# Patient Record
Sex: FEMALE | Race: WHITE | NOT HISPANIC OR LATINO | Employment: OTHER | URBAN - METROPOLITAN AREA
[De-identification: names, ages, dates, MRNs, and addresses within clinical notes are randomized per-mention and may not be internally consistent; named-entity substitution may affect disease eponyms.]

---

## 2017-01-01 ENCOUNTER — OUTSIDE FACILITY SERVICE (OUTPATIENT)
Dept: HOSPITALIST | Facility: HOSPITAL | Age: 82
End: 2017-01-01

## 2017-01-01 ENCOUNTER — APPOINTMENT (OUTPATIENT)
Dept: GENERAL RADIOLOGY | Facility: HOSPITAL | Age: 82
End: 2017-01-01

## 2017-01-01 ENCOUNTER — HOSPITAL ENCOUNTER (OUTPATIENT)
Facility: HOSPITAL | Age: 82
Setting detail: OBSERVATION
Discharge: HOME | End: 2017-07-04
Attending: EMERGENCY MEDICINE | Admitting: INTERNAL MEDICINE

## 2017-01-01 ENCOUNTER — APPOINTMENT (OUTPATIENT)
Dept: CT IMAGING | Facility: HOSPITAL | Age: 82
End: 2017-01-01

## 2017-01-01 ENCOUNTER — APPOINTMENT (OUTPATIENT)
Dept: MRI IMAGING | Facility: HOSPITAL | Age: 82
End: 2017-01-01

## 2017-01-01 ENCOUNTER — LAB REQUISITION (OUTPATIENT)
Dept: LAB | Facility: HOSPITAL | Age: 82
End: 2017-01-01

## 2017-01-01 VITALS
OXYGEN SATURATION: 94 % | TEMPERATURE: 97.9 F | DIASTOLIC BLOOD PRESSURE: 71 MMHG | HEART RATE: 85 BPM | WEIGHT: 147 LBS | RESPIRATION RATE: 17 BRPM | SYSTOLIC BLOOD PRESSURE: 174 MMHG | HEIGHT: 64 IN | BODY MASS INDEX: 25.1 KG/M2

## 2017-01-01 DIAGNOSIS — J96.00 ACUTE RESPIRATORY FAILURE (HCC): ICD-10-CM

## 2017-01-01 DIAGNOSIS — R42 VERTIGO: Primary | ICD-10-CM

## 2017-01-01 DIAGNOSIS — E87.1 HYPONATREMIA: ICD-10-CM

## 2017-01-01 DIAGNOSIS — R53.1 WEAKNESS: ICD-10-CM

## 2017-01-01 DIAGNOSIS — R11.2 NAUSEA AND VOMITING, INTRACTABILITY OF VOMITING NOT SPECIFIED, UNSPECIFIED VOMITING TYPE: ICD-10-CM

## 2017-01-01 LAB
25(OH)D3 SERPL-MCNC: 21 NG/ML
ALBUMIN SERPL-MCNC: 3.6 G/DL (ref 3.5–5.2)
ALBUMIN SERPL-MCNC: 4 G/DL (ref 3.5–5.2)
ALBUMIN/GLOB SERPL: 1.6 G/DL
ALBUMIN/GLOB SERPL: 1.6 G/DL
ALP SERPL-CCNC: 47 U/L (ref 40–129)
ALP SERPL-CCNC: 55 U/L (ref 40–129)
ALT SERPL W P-5'-P-CCNC: 12 U/L (ref 5–33)
ALT SERPL W P-5'-P-CCNC: 14 U/L (ref 5–33)
ANION GAP SERPL CALCULATED.3IONS-SCNC: 11 MMOL/L
ANION GAP SERPL CALCULATED.3IONS-SCNC: 12.8 MMOL/L
ANION GAP SERPL CALCULATED.3IONS-SCNC: 16.7 MMOL/L
AST SERPL-CCNC: 15 U/L (ref 5–32)
AST SERPL-CCNC: 16 U/L (ref 5–32)
BACTERIA UR QL AUTO: ABNORMAL /HPF
BASOPHILS # BLD AUTO: 0.02 10*3/MM3 (ref 0–0.2)
BASOPHILS NFR BLD AUTO: 0.3 % (ref 0–2)
BILIRUB SERPL-MCNC: 0.2 MG/DL (ref 0.2–1.2)
BILIRUB SERPL-MCNC: 0.2 MG/DL (ref 0.2–1.2)
BILIRUB UR QL STRIP: NEGATIVE
BUN BLD-MCNC: 11 MG/DL (ref 8–23)
BUN BLD-MCNC: 11 MG/DL (ref 8–23)
BUN BLD-MCNC: 15 MG/DL (ref 8–23)
BUN/CREAT SERPL: 10 (ref 7–25)
BUN/CREAT SERPL: 10.6 (ref 7–25)
BUN/CREAT SERPL: 11.6 (ref 7–25)
CALCIUM SPEC-SCNC: 7.9 MG/DL (ref 8.2–9.6)
CALCIUM SPEC-SCNC: 8.1 MG/DL (ref 8.2–9.6)
CALCIUM SPEC-SCNC: 8.6 MG/DL (ref 8.2–9.6)
CHLORIDE SERPL-SCNC: 90 MMOL/L (ref 98–107)
CHLORIDE SERPL-SCNC: 98 MMOL/L (ref 98–107)
CHLORIDE SERPL-SCNC: 98 MMOL/L (ref 98–107)
CK SERPL-CCNC: 116 U/L (ref 26–142)
CLARITY UR: CLEAR
CO2 SERPL-SCNC: 21.3 MMOL/L (ref 22–29)
CO2 SERPL-SCNC: 22.2 MMOL/L (ref 22–29)
CO2 SERPL-SCNC: 23 MMOL/L (ref 22–29)
COLOR UR: YELLOW
CREAT BLD-MCNC: 1.04 MG/DL (ref 0.57–1)
CREAT BLD-MCNC: 1.1 MG/DL (ref 0.57–1)
CREAT BLD-MCNC: 1.29 MG/DL (ref 0.57–1)
DEPRECATED RDW RBC AUTO: 39.8 FL (ref 37–54)
DEPRECATED RDW RBC AUTO: 40.4 FL (ref 37–54)
EOSINOPHIL # BLD AUTO: 0 10*3/MM3 (ref 0.1–0.3)
EOSINOPHIL NFR BLD AUTO: 0 % (ref 0–4)
ERYTHROCYTE [DISTWIDTH] IN BLOOD BY AUTOMATED COUNT: 12.3 % (ref 11.5–14.5)
ERYTHROCYTE [DISTWIDTH] IN BLOOD BY AUTOMATED COUNT: 12.4 % (ref 11.5–14.5)
FLUAV AG NPH QL: POSITIVE
FLUBV AG NPH QL IA: NEGATIVE
GFR SERPL CREATININE-BSD FRML MDRD: 39 ML/MIN/1.73
GFR SERPL CREATININE-BSD FRML MDRD: 47 ML/MIN/1.73
GFR SERPL CREATININE-BSD FRML MDRD: 50 ML/MIN/1.73
GLOBULIN UR ELPH-MCNC: 2.2 GM/DL
GLOBULIN UR ELPH-MCNC: 2.5 GM/DL
GLUCOSE BLD-MCNC: 104 MG/DL (ref 65–99)
GLUCOSE BLD-MCNC: 140 MG/DL (ref 65–99)
GLUCOSE BLD-MCNC: 87 MG/DL (ref 65–99)
GLUCOSE UR STRIP-MCNC: NEGATIVE MG/DL
HCT VFR BLD AUTO: 30.4 % (ref 37–47)
HCT VFR BLD AUTO: 32.6 % (ref 37–47)
HGB BLD-MCNC: 10.1 G/DL (ref 12–16)
HGB BLD-MCNC: 11 G/DL (ref 12–16)
HGB UR QL STRIP.AUTO: NEGATIVE
HYALINE CASTS UR QL AUTO: ABNORMAL /LPF
IMM GRANULOCYTES # BLD: 0.03 10*3/MM3 (ref 0–0.03)
IMM GRANULOCYTES NFR BLD: 0.4 % (ref 0–0.5)
INR PPP: 0.99 (ref 0.9–1.1)
KETONES UR QL STRIP: ABNORMAL
LEUKOCYTE ESTERASE UR QL STRIP.AUTO: ABNORMAL
LYMPHOCYTES # BLD AUTO: 0.47 10*3/MM3 (ref 0.6–4.8)
LYMPHOCYTES NFR BLD AUTO: 6 % (ref 20–45)
MCH RBC QN AUTO: 29.4 PG (ref 27–31)
MCH RBC QN AUTO: 29.7 PG (ref 27–31)
MCHC RBC AUTO-ENTMCNC: 33.2 G/DL (ref 31–37)
MCHC RBC AUTO-ENTMCNC: 33.7 G/DL (ref 31–37)
MCV RBC AUTO: 88.1 FL (ref 81–99)
MCV RBC AUTO: 88.6 FL (ref 81–99)
MONOCYTES # BLD AUTO: 0.72 10*3/MM3 (ref 0–1)
MONOCYTES NFR BLD AUTO: 9.1 % (ref 3–8)
NEUTROPHILS # BLD AUTO: 6.64 10*3/MM3 (ref 1.5–8.3)
NEUTROPHILS NFR BLD AUTO: 84.2 % (ref 45–70)
NITRITE UR QL STRIP: NEGATIVE
NRBC BLD MANUAL-RTO: 0 /100 WBC (ref 0–0)
PH UR STRIP.AUTO: 7 [PH] (ref 4.5–8)
PLATELET # BLD AUTO: 303 10*3/MM3 (ref 140–500)
PLATELET # BLD AUTO: 378 10*3/MM3 (ref 140–500)
PMV BLD AUTO: 10 FL (ref 7.4–10.4)
PMV BLD AUTO: 10.4 FL (ref 7.4–10.4)
POTASSIUM BLD-SCNC: 4.3 MMOL/L (ref 3.5–5.2)
POTASSIUM BLD-SCNC: 4.6 MMOL/L (ref 3.5–5.2)
POTASSIUM BLD-SCNC: 4.7 MMOL/L (ref 3.5–5.2)
PROT SERPL-MCNC: 5.8 G/DL (ref 6–8.5)
PROT SERPL-MCNC: 6.5 G/DL (ref 6–8.5)
PROT UR QL STRIP: NEGATIVE
PROTHROMBIN TIME: 13.1 SECONDS (ref 12.1–15)
RBC # BLD AUTO: 3.43 10*6/MM3 (ref 4.2–5.4)
RBC # BLD AUTO: 3.7 10*6/MM3 (ref 4.2–5.4)
RBC # UR: ABNORMAL /HPF
REF LAB TEST METHOD: ABNORMAL
SODIUM BLD-SCNC: 128 MMOL/L (ref 136–145)
SODIUM BLD-SCNC: 132 MMOL/L (ref 136–145)
SODIUM BLD-SCNC: 133 MMOL/L (ref 136–145)
SP GR UR STRIP: 1.01 (ref 1–1.03)
SQUAMOUS #/AREA URNS HPF: ABNORMAL /HPF
T4 FREE SERPL-MCNC: 1.01 NG/DL (ref 0.93–1.7)
TROPONIN T SERPL-MCNC: <0.01 NG/ML (ref 0–0.03)
TSH SERPL DL<=0.05 MIU/L-ACNC: 4.56 MIU/ML (ref 0.27–4.2)
UROBILINOGEN UR QL STRIP: ABNORMAL
WBC NRBC COR # BLD: 4.99 10*3/MM3 (ref 4.8–10.8)
WBC NRBC COR # BLD: 7.88 10*3/MM3 (ref 4.8–10.8)
WBC UR QL AUTO: ABNORMAL /HPF

## 2017-01-01 PROCEDURE — 80053 COMPREHEN METABOLIC PANEL: CPT | Performed by: EMERGENCY MEDICINE

## 2017-01-01 PROCEDURE — G0378 HOSPITAL OBSERVATION PER HR: HCPCS

## 2017-01-01 PROCEDURE — 25010000002 METHYLPREDNISOLONE PER 125 MG: Performed by: PSYCHIATRY & NEUROLOGY

## 2017-01-01 PROCEDURE — 84484 ASSAY OF TROPONIN QUANT: CPT | Performed by: INTERNAL MEDICINE

## 2017-01-01 PROCEDURE — 99225 PR SBSQ OBSERVATION CARE/DAY 25 MINUTES: CPT | Performed by: HOSPITALIST

## 2017-01-01 PROCEDURE — 82306 VITAMIN D 25 HYDROXY: CPT | Performed by: NURSE PRACTITIONER

## 2017-01-01 PROCEDURE — 93005 ELECTROCARDIOGRAM TRACING: CPT | Performed by: INTERNAL MEDICINE

## 2017-01-01 PROCEDURE — 99308 SBSQ NF CARE LOW MDM 20: CPT | Performed by: HOSPITALIST

## 2017-01-01 PROCEDURE — 74020 HC XR ABDOMEN FLAT & UPRIGHT: CPT

## 2017-01-01 PROCEDURE — 25010000002 ONDANSETRON PER 1 MG: Performed by: INTERNAL MEDICINE

## 2017-01-01 PROCEDURE — 97162 PT EVAL MOD COMPLEX 30 MIN: CPT | Performed by: PHYSICAL THERAPIST

## 2017-01-01 PROCEDURE — 87804 INFLUENZA ASSAY W/OPTIC: CPT | Performed by: HOSPITALIST

## 2017-01-01 PROCEDURE — G8978 MOBILITY CURRENT STATUS: HCPCS

## 2017-01-01 PROCEDURE — 96361 HYDRATE IV INFUSION ADD-ON: CPT

## 2017-01-01 PROCEDURE — 84484 ASSAY OF TROPONIN QUANT: CPT | Performed by: EMERGENCY MEDICINE

## 2017-01-01 PROCEDURE — G8979 MOBILITY GOAL STATUS: HCPCS

## 2017-01-01 PROCEDURE — 93005 ELECTROCARDIOGRAM TRACING: CPT | Performed by: EMERGENCY MEDICINE

## 2017-01-01 PROCEDURE — 96376 TX/PRO/DX INJ SAME DRUG ADON: CPT

## 2017-01-01 PROCEDURE — 99285 EMERGENCY DEPT VISIT HI MDM: CPT | Performed by: EMERGENCY MEDICINE

## 2017-01-01 PROCEDURE — 99307 SBSQ NF CARE SF MDM 10: CPT | Performed by: INTERNAL MEDICINE

## 2017-01-01 PROCEDURE — 85027 COMPLETE CBC AUTOMATED: CPT | Performed by: INTERNAL MEDICINE

## 2017-01-01 PROCEDURE — 96374 THER/PROPH/DIAG INJ IV PUSH: CPT

## 2017-01-01 PROCEDURE — 93010 ELECTROCARDIOGRAM REPORT: CPT | Performed by: INTERNAL MEDICINE

## 2017-01-01 PROCEDURE — 81001 URINALYSIS AUTO W/SCOPE: CPT | Performed by: EMERGENCY MEDICINE

## 2017-01-01 PROCEDURE — G8987 SELF CARE CURRENT STATUS: HCPCS

## 2017-01-01 PROCEDURE — 96375 TX/PRO/DX INJ NEW DRUG ADDON: CPT

## 2017-01-01 PROCEDURE — 99219 PR INITIAL OBSERVATION CARE/DAY 50 MINUTES: CPT | Performed by: INTERNAL MEDICINE

## 2017-01-01 PROCEDURE — 99304 1ST NF CARE SF/LOW MDM 25: CPT | Performed by: HOSPITALIST

## 2017-01-01 PROCEDURE — 25010000002 LORAZEPAM PER 2 MG: Performed by: PSYCHIATRY & NEUROLOGY

## 2017-01-01 PROCEDURE — 99307 SBSQ NF CARE SF MDM 10: CPT | Performed by: HOSPITALIST

## 2017-01-01 PROCEDURE — G8988 SELF CARE GOAL STATUS: HCPCS

## 2017-01-01 PROCEDURE — 97166 OT EVAL MOD COMPLEX 45 MIN: CPT

## 2017-01-01 PROCEDURE — 85025 COMPLETE CBC W/AUTO DIFF WBC: CPT | Performed by: EMERGENCY MEDICINE

## 2017-01-01 PROCEDURE — 82550 ASSAY OF CK (CPK): CPT | Performed by: EMERGENCY MEDICINE

## 2017-01-01 PROCEDURE — 84443 ASSAY THYROID STIM HORMONE: CPT | Performed by: INTERNAL MEDICINE

## 2017-01-01 PROCEDURE — 84439 ASSAY OF FREE THYROXINE: CPT | Performed by: HOSPITALIST

## 2017-01-01 PROCEDURE — 99284 EMERGENCY DEPT VISIT MOD MDM: CPT

## 2017-01-01 PROCEDURE — 70551 MRI BRAIN STEM W/O DYE: CPT

## 2017-01-01 PROCEDURE — 94799 UNLISTED PULMONARY SVC/PX: CPT

## 2017-01-01 PROCEDURE — 99225 PR SBSQ OBSERVATION CARE/DAY 25 MINUTES: CPT | Performed by: NURSE PRACTITIONER

## 2017-01-01 PROCEDURE — 80053 COMPREHEN METABOLIC PANEL: CPT | Performed by: INTERNAL MEDICINE

## 2017-01-01 PROCEDURE — 80048 BASIC METABOLIC PNL TOTAL CA: CPT | Performed by: HOSPITALIST

## 2017-01-01 PROCEDURE — 70450 CT HEAD/BRAIN W/O DYE: CPT

## 2017-01-01 PROCEDURE — 25010000002 ONDANSETRON PER 1 MG: Performed by: EMERGENCY MEDICINE

## 2017-01-01 PROCEDURE — 97116 GAIT TRAINING THERAPY: CPT

## 2017-01-01 PROCEDURE — 99217 PR OBSERVATION CARE DISCHARGE MANAGEMENT: CPT | Performed by: NURSE PRACTITIONER

## 2017-01-01 PROCEDURE — 85610 PROTHROMBIN TIME: CPT | Performed by: EMERGENCY MEDICINE

## 2017-01-01 RX ORDER — METHYLPREDNISOLONE SODIUM SUCCINATE 125 MG/2ML
60 INJECTION, POWDER, LYOPHILIZED, FOR SOLUTION INTRAMUSCULAR; INTRAVENOUS DAILY
Status: DISCONTINUED | OUTPATIENT
Start: 2017-01-01 | End: 2017-01-01

## 2017-01-01 RX ORDER — AMLODIPINE BESYLATE 5 MG/1
5 TABLET ORAL EVERY 24 HOURS
Status: DISCONTINUED | OUTPATIENT
Start: 2017-01-01 | End: 2017-01-01 | Stop reason: HOSPADM

## 2017-01-01 RX ORDER — CLONAZEPAM 0.5 MG/1
0.25 TABLET ORAL 2 TIMES DAILY
Refills: 0
Start: 2017-01-01 | End: 2017-01-01

## 2017-01-01 RX ORDER — LORAZEPAM 2 MG/ML
1 INJECTION INTRAMUSCULAR ONCE
Status: COMPLETED | OUTPATIENT
Start: 2017-01-01 | End: 2017-01-01

## 2017-01-01 RX ORDER — LISINOPRIL 20 MG/1
20 TABLET ORAL
Start: 2017-01-01

## 2017-01-01 RX ORDER — MECLIZINE HYDROCHLORIDE 25 MG/1
25 TABLET ORAL 4 TIMES DAILY
COMMUNITY
End: 2018-01-01

## 2017-01-01 RX ORDER — BISACODYL 10 MG
10 SUPPOSITORY, RECTAL RECTAL DAILY
Status: DISCONTINUED | OUTPATIENT
Start: 2017-01-01 | End: 2017-01-01 | Stop reason: HOSPADM

## 2017-01-01 RX ORDER — ASPIRIN 81 MG/1
81 TABLET, CHEWABLE ORAL DAILY
Start: 2017-01-01

## 2017-01-01 RX ORDER — CAPTOPRIL 12.5 MG/1
100 TABLET ORAL 2 TIMES DAILY
Status: DISCONTINUED | OUTPATIENT
Start: 2017-01-01 | End: 2017-01-01

## 2017-01-01 RX ORDER — ONDANSETRON 2 MG/ML
4 INJECTION INTRAMUSCULAR; INTRAVENOUS ONCE
Status: COMPLETED | OUTPATIENT
Start: 2017-01-01 | End: 2017-01-01

## 2017-01-01 RX ORDER — MULTIPLE VITAMINS W/ MINERALS TAB 9MG-400MCG
1 TAB ORAL DAILY
Status: DISCONTINUED | OUTPATIENT
Start: 2017-01-01 | End: 2017-01-01 | Stop reason: HOSPADM

## 2017-01-01 RX ORDER — DOCUSATE SODIUM 100 MG/1
100 CAPSULE, LIQUID FILLED ORAL NIGHTLY
Status: DISCONTINUED | OUTPATIENT
Start: 2017-01-01 | End: 2017-01-01

## 2017-01-01 RX ORDER — POLYETHYLENE GLYCOL 3350 17 G/17G
17 POWDER, FOR SOLUTION ORAL DAILY
Start: 2017-01-01

## 2017-01-01 RX ORDER — CELECOXIB 200 MG/1
200 CAPSULE ORAL DAILY
Status: DISCONTINUED | OUTPATIENT
Start: 2017-01-01 | End: 2017-01-01 | Stop reason: HOSPADM

## 2017-01-01 RX ORDER — BISACODYL 10 MG
10 SUPPOSITORY, RECTAL RECTAL DAILY
Refills: 0
Start: 2017-01-01

## 2017-01-01 RX ORDER — SULFAMETHOXAZOLE AND TRIMETHOPRIM 800; 160 MG/1; MG/1
1 TABLET ORAL 2 TIMES DAILY
Status: DISCONTINUED | OUTPATIENT
Start: 2017-01-01 | End: 2017-01-01

## 2017-01-01 RX ORDER — SODIUM CHLORIDE 9 MG/ML
100 INJECTION, SOLUTION INTRAVENOUS CONTINUOUS
Status: DISCONTINUED | OUTPATIENT
Start: 2017-01-01 | End: 2017-01-01

## 2017-01-01 RX ORDER — PANTOPRAZOLE SODIUM 40 MG/1
40 TABLET, DELAYED RELEASE ORAL DAILY
Start: 2017-01-01

## 2017-01-01 RX ORDER — PANTOPRAZOLE SODIUM 40 MG/1
40 TABLET, DELAYED RELEASE ORAL
Status: DISCONTINUED | OUTPATIENT
Start: 2017-01-01 | End: 2017-01-01 | Stop reason: HOSPADM

## 2017-01-01 RX ORDER — CLONAZEPAM 0.5 MG/1
0.25 TABLET ORAL 2 TIMES DAILY
Status: DISCONTINUED | OUTPATIENT
Start: 2017-01-01 | End: 2017-01-01 | Stop reason: HOSPADM

## 2017-01-01 RX ORDER — ACETAMINOPHEN 325 MG/1
650 TABLET ORAL EVERY 6 HOURS PRN
Status: DISCONTINUED | OUTPATIENT
Start: 2017-01-01 | End: 2017-01-01 | Stop reason: HOSPADM

## 2017-01-01 RX ORDER — MECLIZINE HYDROCHLORIDE 25 MG/1
25 TABLET ORAL 4 TIMES DAILY
Status: DISCONTINUED | OUTPATIENT
Start: 2017-01-01 | End: 2017-01-01

## 2017-01-01 RX ORDER — SODIUM CHLORIDE 0.9 % (FLUSH) 0.9 %
1-10 SYRINGE (ML) INJECTION AS NEEDED
Status: DISCONTINUED | OUTPATIENT
Start: 2017-01-01 | End: 2017-01-01 | Stop reason: HOSPADM

## 2017-01-01 RX ORDER — LISINOPRIL 20 MG/1
20 TABLET ORAL
Status: DISCONTINUED | OUTPATIENT
Start: 2017-01-01 | End: 2017-01-01 | Stop reason: HOSPADM

## 2017-01-01 RX ORDER — POLYETHYLENE GLYCOL 3350 17 G/17G
17 POWDER, FOR SOLUTION ORAL DAILY
Status: DISCONTINUED | OUTPATIENT
Start: 2017-01-01 | End: 2017-01-01 | Stop reason: HOSPADM

## 2017-01-01 RX ORDER — ASPIRIN 81 MG/1
81 TABLET, CHEWABLE ORAL DAILY
Status: DISCONTINUED | OUTPATIENT
Start: 2017-01-01 | End: 2017-01-01 | Stop reason: HOSPADM

## 2017-01-01 RX ORDER — DOCUSATE SODIUM 100 MG/1
100 CAPSULE, LIQUID FILLED ORAL 2 TIMES DAILY
Status: DISCONTINUED | OUTPATIENT
Start: 2017-01-01 | End: 2017-01-01 | Stop reason: HOSPADM

## 2017-01-01 RX ORDER — ONDANSETRON 2 MG/ML
INJECTION INTRAMUSCULAR; INTRAVENOUS
Status: DISPENSED
Start: 2017-01-01 | End: 2017-01-01

## 2017-01-01 RX ORDER — SODIUM CHLORIDE 0.9 % (FLUSH) 0.9 %
10 SYRINGE (ML) INJECTION AS NEEDED
Status: DISCONTINUED | OUTPATIENT
Start: 2017-01-01 | End: 2017-01-01 | Stop reason: HOSPADM

## 2017-01-01 RX ORDER — MECLIZINE HYDROCHLORIDE 25 MG/1
25 TABLET ORAL ONCE
Status: COMPLETED | OUTPATIENT
Start: 2017-01-01 | End: 2017-01-01

## 2017-01-01 RX ORDER — ONDANSETRON 2 MG/ML
4 INJECTION INTRAMUSCULAR; INTRAVENOUS EVERY 6 HOURS PRN
Status: DISCONTINUED | OUTPATIENT
Start: 2017-01-01 | End: 2017-01-01 | Stop reason: HOSPADM

## 2017-01-01 RX ORDER — SULFAMETHOXAZOLE AND TRIMETHOPRIM 800; 160 MG/1; MG/1
1 TABLET ORAL 2 TIMES DAILY
COMMUNITY
End: 2017-01-01 | Stop reason: HOSPADM

## 2017-01-01 RX ADMIN — LORAZEPAM 1 MG: 2 INJECTION INTRAMUSCULAR; INTRAVENOUS at 15:18

## 2017-01-01 RX ADMIN — SULFAMETHOXAZOLE AND TRIMETHOPRIM 160 MG: 800; 160 TABLET ORAL at 08:49

## 2017-01-01 RX ADMIN — CLONAZEPAM 0.25 MG: 0.5 TABLET ORAL at 18:11

## 2017-01-01 RX ADMIN — CLONAZEPAM 0.25 MG: 0.5 TABLET ORAL at 09:28

## 2017-01-01 RX ADMIN — MECLIZINE HYDROCHLORIDE 25 MG: 25 TABLET ORAL at 08:49

## 2017-01-01 RX ADMIN — POLYETHYLENE GLYCOL 3350 17 G: 17 POWDER, FOR SOLUTION ORAL at 18:13

## 2017-01-01 RX ADMIN — ONDANSETRON 4 MG: 2 INJECTION, SOLUTION INTRAMUSCULAR; INTRAVENOUS at 17:38

## 2017-01-01 RX ADMIN — PANTOPRAZOLE SODIUM 40 MG: 40 TABLET, DELAYED RELEASE ORAL at 06:16

## 2017-01-01 RX ADMIN — METHYLPREDNISOLONE SODIUM SUCCINATE 62.5 MG: 125 INJECTION, POWDER, FOR SOLUTION INTRAMUSCULAR; INTRAVENOUS at 10:31

## 2017-01-01 RX ADMIN — Medication 300 ML: at 12:19

## 2017-01-01 RX ADMIN — DOCUSATE SODIUM 100 MG: 100 CAPSULE, LIQUID FILLED ORAL at 20:07

## 2017-01-01 RX ADMIN — POLYETHYLENE GLYCOL 3350 17 G: 17 POWDER, FOR SOLUTION ORAL at 11:34

## 2017-01-01 RX ADMIN — CLONAZEPAM 0.25 MG: 0.5 TABLET ORAL at 13:49

## 2017-01-01 RX ADMIN — SULFAMETHOXAZOLE AND TRIMETHOPRIM 160 MG: 800; 160 TABLET ORAL at 23:05

## 2017-01-01 RX ADMIN — SODIUM CHLORIDE 100 ML/HR: 9 INJECTION, SOLUTION INTRAVENOUS at 05:32

## 2017-01-01 RX ADMIN — ASPIRIN 81 MG CHEWABLE TABLET 81 MG: 81 TABLET CHEWABLE at 10:32

## 2017-01-01 RX ADMIN — AMLODIPINE BESYLATE 5 MG: 5 TABLET ORAL at 13:30

## 2017-01-01 RX ADMIN — PANTOPRAZOLE SODIUM 40 MG: 40 TABLET, DELAYED RELEASE ORAL at 05:53

## 2017-01-01 RX ADMIN — AMLODIPINE BESYLATE 5 MG: 5 TABLET ORAL at 13:12

## 2017-01-01 RX ADMIN — AMLODIPINE BESYLATE 5 MG: 5 TABLET ORAL at 13:49

## 2017-01-01 RX ADMIN — CELECOXIB 200 MG: 200 CAPSULE ORAL at 08:49

## 2017-01-01 RX ADMIN — ONDANSETRON 4 MG: 2 INJECTION, SOLUTION INTRAMUSCULAR; INTRAVENOUS at 05:53

## 2017-01-01 RX ADMIN — LISINOPRIL 20 MG: 20 TABLET ORAL at 08:49

## 2017-01-01 RX ADMIN — MECLIZINE HYDROCHLORIDE 25 MG: 25 TABLET ORAL at 23:05

## 2017-01-01 RX ADMIN — Medication 300 ML: at 18:30

## 2017-01-01 RX ADMIN — CLONAZEPAM 0.25 MG: 0.5 TABLET ORAL at 10:44

## 2017-01-01 RX ADMIN — Medication 1 TABLET: at 08:49

## 2017-01-01 RX ADMIN — METHYLPREDNISOLONE SODIUM SUCCINATE 60 MG: 125 INJECTION, POWDER, FOR SOLUTION INTRAMUSCULAR; INTRAVENOUS at 13:49

## 2017-01-01 RX ADMIN — DOCUSATE SODIUM 100 MG: 100 CAPSULE, LIQUID FILLED ORAL at 20:49

## 2017-01-01 RX ADMIN — Medication 1 TABLET: at 09:28

## 2017-01-01 RX ADMIN — ONDANSETRON 4 MG: 2 INJECTION, SOLUTION INTRAMUSCULAR; INTRAVENOUS at 21:29

## 2017-01-01 RX ADMIN — PANTOPRAZOLE SODIUM 40 MG: 40 TABLET, DELAYED RELEASE ORAL at 05:39

## 2017-01-01 RX ADMIN — LISINOPRIL 20 MG: 20 TABLET ORAL at 10:32

## 2017-01-01 RX ADMIN — CLONAZEPAM 0.25 MG: 0.5 TABLET ORAL at 18:12

## 2017-01-01 RX ADMIN — Medication 1 TABLET: at 10:32

## 2017-01-01 RX ADMIN — ASPIRIN 81 MG CHEWABLE TABLET 81 MG: 81 TABLET CHEWABLE at 09:31

## 2017-01-01 RX ADMIN — METHYLPREDNISOLONE SODIUM SUCCINATE 60 MG: 125 INJECTION, POWDER, FOR SOLUTION INTRAMUSCULAR; INTRAVENOUS at 09:34

## 2017-01-01 RX ADMIN — Medication 10 ML: at 17:29

## 2017-01-01 RX ADMIN — MECLIZINE HYDROCHLORIDE 25 MG: 25 TABLET ORAL at 19:35

## 2017-01-01 RX ADMIN — LISINOPRIL 20 MG: 20 TABLET ORAL at 09:28

## 2017-01-01 RX ADMIN — CELECOXIB 200 MG: 200 CAPSULE ORAL at 09:28

## 2017-01-01 RX ADMIN — DOCUSATE SODIUM 100 MG: 100 CAPSULE, LIQUID FILLED ORAL at 23:05

## 2017-01-01 RX ADMIN — CELECOXIB 200 MG: 200 CAPSULE ORAL at 10:32

## 2017-01-01 RX ADMIN — ASPIRIN 81 MG CHEWABLE TABLET 81 MG: 81 TABLET CHEWABLE at 13:49

## 2017-01-01 RX ADMIN — BISACODYL 10 MG: 10 SUPPOSITORY RECTAL at 18:14

## 2017-01-01 RX ADMIN — SODIUM CHLORIDE 100 ML/HR: 9 INJECTION, SOLUTION INTRAVENOUS at 19:15

## 2017-01-01 RX ADMIN — ACETAMINOPHEN 650 MG: 325 TABLET, FILM COATED ORAL at 20:06

## 2017-01-01 RX ADMIN — BISACODYL 10 MG: 10 SUPPOSITORY RECTAL at 10:31

## 2017-06-15 ENCOUNTER — HOSPITAL ENCOUNTER (EMERGENCY)
Facility: HOSPITAL | Age: 82
Discharge: HOME | End: 2017-06-15
Attending: EMERGENCY MEDICINE | Admitting: EMERGENCY MEDICINE

## 2017-06-15 ENCOUNTER — APPOINTMENT (OUTPATIENT)
Dept: CT IMAGING | Facility: HOSPITAL | Age: 82
End: 2017-06-15

## 2017-06-15 VITALS
WEIGHT: 145 LBS | OXYGEN SATURATION: 95 % | HEART RATE: 89 BPM | TEMPERATURE: 98.2 F | BODY MASS INDEX: 26.68 KG/M2 | HEIGHT: 62 IN | SYSTOLIC BLOOD PRESSURE: 184 MMHG | DIASTOLIC BLOOD PRESSURE: 85 MMHG | RESPIRATION RATE: 18 BRPM

## 2017-06-15 DIAGNOSIS — H81.10 BENIGN POSITIONAL VERTIGO, UNSPECIFIED LATERALITY: Primary | ICD-10-CM

## 2017-06-15 LAB
ALBUMIN SERPL-MCNC: 3.9 G/DL (ref 3.5–5.2)
ALBUMIN/GLOB SERPL: 1.3 G/DL
ALP SERPL-CCNC: 72 U/L (ref 40–129)
ALT SERPL W P-5'-P-CCNC: 16 U/L (ref 5–33)
ANION GAP SERPL CALCULATED.3IONS-SCNC: 15.1 MMOL/L
APTT PPP: 27.5 SECONDS (ref 24.3–38.1)
AST SERPL-CCNC: 13 U/L (ref 5–32)
BASOPHILS # BLD AUTO: 0.02 10*3/MM3 (ref 0–0.2)
BASOPHILS NFR BLD AUTO: 0.3 % (ref 0–2)
BILIRUB SERPL-MCNC: 0.2 MG/DL (ref 0.2–1.2)
BILIRUB UR QL STRIP: NEGATIVE
BUN BLD-MCNC: 16 MG/DL (ref 8–23)
BUN/CREAT SERPL: 19.5 (ref 7–25)
CALCIUM SPEC-SCNC: 8.9 MG/DL (ref 8.2–9.6)
CHLORIDE SERPL-SCNC: 100 MMOL/L (ref 98–107)
CLARITY UR: CLEAR
CO2 SERPL-SCNC: 24.9 MMOL/L (ref 22–29)
COLOR UR: YELLOW
CREAT BLD-MCNC: 0.82 MG/DL (ref 0.57–1)
DEPRECATED RDW RBC AUTO: 42.5 FL (ref 37–54)
EOSINOPHIL # BLD AUTO: 0.02 10*3/MM3 (ref 0.1–0.3)
EOSINOPHIL NFR BLD AUTO: 0.3 % (ref 0–4)
ERYTHROCYTE [DISTWIDTH] IN BLOOD BY AUTOMATED COUNT: 12.4 % (ref 11.5–14.5)
GFR SERPL CREATININE-BSD FRML MDRD: 65 ML/MIN/1.73
GLOBULIN UR ELPH-MCNC: 3 GM/DL
GLUCOSE BLD-MCNC: 104 MG/DL (ref 65–99)
GLUCOSE UR STRIP-MCNC: NEGATIVE MG/DL
HCT VFR BLD AUTO: 35.2 % (ref 37–47)
HGB BLD-MCNC: 11.4 G/DL (ref 12–16)
HGB UR QL STRIP.AUTO: NEGATIVE
IMM GRANULOCYTES # BLD: 0.02 10*3/MM3 (ref 0–0.03)
IMM GRANULOCYTES NFR BLD: 0.3 % (ref 0–0.5)
INR PPP: 0.98 (ref 0.9–1.1)
KETONES UR QL STRIP: ABNORMAL
LEUKOCYTE ESTERASE UR QL STRIP.AUTO: NEGATIVE
LIPASE SERPL-CCNC: 28 U/L (ref 13–60)
LYMPHOCYTES # BLD AUTO: 0.88 10*3/MM3 (ref 0.6–4.8)
LYMPHOCYTES NFR BLD AUTO: 15 % (ref 20–45)
MCH RBC QN AUTO: 30.1 PG (ref 27–31)
MCHC RBC AUTO-ENTMCNC: 32.4 G/DL (ref 31–37)
MCV RBC AUTO: 92.9 FL (ref 81–99)
MONOCYTES # BLD AUTO: 0.72 10*3/MM3 (ref 0–1)
MONOCYTES NFR BLD AUTO: 12.3 % (ref 3–8)
NEUTROPHILS # BLD AUTO: 4.21 10*3/MM3 (ref 1.5–8.3)
NEUTROPHILS NFR BLD AUTO: 71.8 % (ref 45–70)
NITRITE UR QL STRIP: NEGATIVE
NRBC BLD MANUAL-RTO: 0 /100 WBC (ref 0–0)
PH UR STRIP.AUTO: 7 [PH] (ref 4.5–8)
PLATELET # BLD AUTO: 275 10*3/MM3 (ref 140–500)
PMV BLD AUTO: 10 FL (ref 7.4–10.4)
POTASSIUM BLD-SCNC: 4 MMOL/L (ref 3.5–5.2)
PROT SERPL-MCNC: 6.9 G/DL (ref 6–8.5)
PROT UR QL STRIP: NEGATIVE
PROTHROMBIN TIME: 13 SECONDS (ref 12.1–15)
RBC # BLD AUTO: 3.79 10*6/MM3 (ref 4.2–5.4)
SODIUM BLD-SCNC: 140 MMOL/L (ref 136–145)
SP GR UR STRIP: 1.01 (ref 1–1.03)
TROPONIN T SERPL-MCNC: <0.01 NG/ML (ref 0–0.03)
UROBILINOGEN UR QL STRIP: ABNORMAL
WBC NRBC COR # BLD: 5.87 10*3/MM3 (ref 4.8–10.8)

## 2017-06-15 PROCEDURE — 70450 CT HEAD/BRAIN W/O DYE: CPT

## 2017-06-15 PROCEDURE — 85025 COMPLETE CBC W/AUTO DIFF WBC: CPT | Performed by: EMERGENCY MEDICINE

## 2017-06-15 PROCEDURE — 83690 ASSAY OF LIPASE: CPT | Performed by: EMERGENCY MEDICINE

## 2017-06-15 PROCEDURE — 93005 ELECTROCARDIOGRAM TRACING: CPT | Performed by: EMERGENCY MEDICINE

## 2017-06-15 PROCEDURE — 99284 EMERGENCY DEPT VISIT MOD MDM: CPT | Performed by: EMERGENCY MEDICINE

## 2017-06-15 PROCEDURE — 84484 ASSAY OF TROPONIN QUANT: CPT | Performed by: EMERGENCY MEDICINE

## 2017-06-15 PROCEDURE — 81003 URINALYSIS AUTO W/O SCOPE: CPT | Performed by: EMERGENCY MEDICINE

## 2017-06-15 PROCEDURE — 85610 PROTHROMBIN TIME: CPT | Performed by: EMERGENCY MEDICINE

## 2017-06-15 PROCEDURE — 93010 ELECTROCARDIOGRAM REPORT: CPT | Performed by: INTERNAL MEDICINE

## 2017-06-15 PROCEDURE — 99283 EMERGENCY DEPT VISIT LOW MDM: CPT

## 2017-06-15 PROCEDURE — 85730 THROMBOPLASTIN TIME PARTIAL: CPT | Performed by: EMERGENCY MEDICINE

## 2017-06-15 PROCEDURE — 80053 COMPREHEN METABOLIC PANEL: CPT | Performed by: EMERGENCY MEDICINE

## 2017-06-15 RX ORDER — CELECOXIB 200 MG/1
200 CAPSULE ORAL DAILY
COMMUNITY
End: 2018-01-01

## 2017-06-15 RX ORDER — LEVOTHYROXINE SODIUM 88 UG/1
88 TABLET ORAL DAILY
COMMUNITY

## 2017-06-15 RX ORDER — MECLIZINE HCL 25MG 25 MG/1
25 TABLET, CHEWABLE ORAL 4 TIMES DAILY
Qty: 40 TABLET | Refills: 0 | Status: SHIPPED | OUTPATIENT
Start: 2017-06-15 | End: 2017-01-01

## 2017-06-15 RX ORDER — AMLODIPINE BESYLATE 5 MG/1
5 TABLET ORAL DAILY
COMMUNITY

## 2017-06-15 RX ORDER — DOCUSATE SODIUM 100 MG/1
250 CAPSULE, LIQUID FILLED ORAL NIGHTLY
COMMUNITY

## 2017-06-15 RX ORDER — CHLORDIAZEPOXIDE HYDROCHLORIDE 10 MG/1
10 CAPSULE, GELATIN COATED ORAL 2 TIMES DAILY
COMMUNITY
End: 2017-01-01 | Stop reason: HOSPADM

## 2017-06-15 RX ORDER — CAPTOPRIL 50 MG/1
100 TABLET ORAL 2 TIMES DAILY
COMMUNITY
End: 2017-01-01 | Stop reason: HOSPADM

## 2017-06-15 RX ORDER — ACETAMINOPHEN 500 MG
1000 TABLET ORAL EVERY 8 HOURS
COMMUNITY

## 2017-06-15 RX ORDER — SODIUM CHLORIDE 0.9 % (FLUSH) 0.9 %
10 SYRINGE (ML) INJECTION AS NEEDED
Status: DISCONTINUED | OUTPATIENT
Start: 2017-06-15 | End: 2017-06-15 | Stop reason: HOSPADM

## 2017-06-15 RX ORDER — FUROSEMIDE 40 MG/1
40 TABLET ORAL DAILY
COMMUNITY
End: 2017-01-01 | Stop reason: HOSPADM

## 2017-06-15 RX ORDER — MECLIZINE HCL 25MG 25 MG/1
25 TABLET, CHEWABLE ORAL 4 TIMES DAILY
COMMUNITY
End: 2017-06-15

## 2017-06-15 NOTE — ED TRIAGE NOTES
"2 daughters accompanying pt reported being concerned about her living alone.  Reported increasing dementia and dizziness so much that they have stayed with her the past couple days.  Daughter reported pt got her hair done today and they had to hold pt up in bed b/c she was too dizzy to sit up unassisted.  Pt reported h/o \"inner ear problems for a long time\"  "

## 2017-06-15 NOTE — ED PROVIDER NOTES
Subjective   History of Present Illness  History of Present Illness    Chief complaint: Episodic dizziness and frequent falls over the past 3 weeks    Location: Not applicable    Quality/Severity:  Moderate    Timing/Duration: Patient relates intermittent episodes of vertigo for many years and for the past 3 weeks the patient has been having spells of dizziness severe enough to cause her to fall.    Modifying Factors: Dementia and frequent ear infections as a child    Associated Symptoms: None    Narrative: The patient is a 89-year-old white female brought to the emergency department as noted above.  History and review of systems from the patient is dubious as she suffers from dementia.  The patient's daughters state that the patient has been complaining of episodic dizziness and has been having frequent falls over the past 3 weeks.  The patient is followed by a therapist who has treated the patient for vertigo.  Patient does relate that she had multiple ear infections as a child and at times would have pus coming out of her ears.    Review of Systems   Constitutional: Negative for activity change, appetite change and fever.   HENT: Positive for hearing loss (chronic). Negative for congestion.    Cardiovascular: Negative for chest pain.   Genitourinary: Negative for dysuria, flank pain and hematuria.   Neurological: Positive for dizziness and weakness. Negative for headaches.   Hematological: Bruises/bleeds easily (age-related).   Psychiatric/Behavioral: Positive for confusion and decreased concentration. The patient is nervous/anxious.     it should be noted that the review of systems is mostly secondhand from the patient's daughters.    Past Medical History:   Diagnosis Date   • Arthritis    • Constipation    • Degenerative disc disease, lumbar    • Dizziness    • Edema extremities    • Hard of hearing    • Hyperlipidemia    • Hypertension    • Inner ear dysfunction    • Macular degeneration        Allergies    Allergen Reactions   • Epinephrine Palpitations       Past Surgical History:   Procedure Laterality Date   • ADENOIDECTOMY     • APPENDECTOMY     • CHOLECYSTECTOMY     • EYE SURGERY     • TONSILLECTOMY     • TOTAL HIP ARTHROPLASTY Right    • TOTAL KNEE ARTHROPLASTY Left        History reviewed. No pertinent family history.    Social History     Social History   • Marital status:      Spouse name: N/A   • Number of children: N/A   • Years of education: N/A     Social History Main Topics   • Smoking status: Passive Smoke Exposure - Never Smoker   • Smokeless tobacco: None   • Alcohol use No   • Drug use: No   • Sexual activity: Not Currently     Other Topics Concern   • None     Social History Narrative   • None           Objective   Physical Exam   Constitutional: She is oriented to person, place, and time.   The patient is a very talkative, elderly, pale-appearing, white female in no acute distress.   HENT:   Head: Normocephalic and atraumatic.   Eyes: Conjunctivae and EOM are normal. Pupils are equal, round, and reactive to light.   Neck: Normal range of motion. Neck supple. No thyromegaly present.   No carotid bruits   Cardiovascular: Normal rate, regular rhythm and normal heart sounds.    No murmur heard.  Pulmonary/Chest: Effort normal and breath sounds normal. No respiratory distress. She has no wheezes. She has no rales.   Abdominal: Soft. Bowel sounds are normal. She exhibits no distension. There is no tenderness.   Musculoskeletal: Normal range of motion. She exhibits no edema or tenderness.   Osteoarthritis changes noted in the right hand and the patient has had a left total knee replacement.  Minor bruising noted on the right anterior tibial area.  Some tenderness of the left wrist without obvious swelling or deformity.  All neuromuscular exams intact.   Lymphadenopathy:     She has no cervical adenopathy.   Neurological: She is alert and oriented to person, place, and time. No cranial nerve  deficit. Coordination normal.   The patient is alert and oriented ×3, but does appear mildly confused.  Patient is noted to frequently repeat herself and speaks somewhat contentiously with her daughters.   Skin: Skin is warm and dry. No rash noted. There is pallor.   Psychiatric:   Judgment impaired in that the patient thinks that there is nothing wrong with her other than an ear problem that extends back into her childhood.  Patient noted to be somewhat verbally aggressive with her daughters.   Nursing note and vitals reviewed.      Procedures    Final diagnoses:   Benign positional vertigo, unspecified laterality            ED Course  ED Course   Comment By Time   06/15/17, 3:05 PM  EKG was reviewed at 1500 hrs. and showed a normal sinus rhythm with a rate of 79 bpm.  QRS complexes, ST segments and T waves all unremarkable.  No ectopy. Jimenez Segovia MD 06/15 1505   06/15/17, 4:16 PM  Radiology results on the head CT noted.  All findings discussed with patient and family.  Diagnosis of vertigo explained.  Treatment plan, expectations and warnings discussed Jimenez Segovia MD 06/15 1617                  MDM  Number of Diagnoses or Management Options  Benign positional vertigo, unspecified laterality:      Amount and/or Complexity of Data Reviewed  Clinical lab tests: ordered and reviewed  Tests in the radiology section of CPT®: ordered and reviewed  Independent visualization of images, tracings, or specimens: yes    Risk of Complications, Morbidity, and/or Mortality  Presenting problems: high  Diagnostic procedures: high  Management options: high      Labs this visit  Lab Results (last 24 hours)     Procedure Component Value Units Date/Time    CBC & Differential [[id number]] Collected:  06/15/17 1442    Specimen:  Blood Updated:  06/15/17 1449    Narrative:       The following orders were created for panel order CBC & Differential.  Procedure                               Abnormality         Status                      ---------                               -----------         ------                     CBC Auto Differential[[id number]]        Abnormal            Final result                 Please view results for these tests on the individual orders.    Comprehensive Metabolic Panel [[id number]]  (Abnormal) Collected:  06/15/17 1442    Specimen:  Blood Updated:  06/15/17 1510     Glucose 104 (H) mg/dL      BUN 16 mg/dL      Creatinine 0.82 mg/dL      Sodium 140 mmol/L      Potassium 4.0 mmol/L      Chloride 100 mmol/L      CO2 24.9 mmol/L      Calcium 8.9 mg/dL      Total Protein 6.9 g/dL      Albumin 3.90 g/dL      ALT (SGPT) 16 U/L      AST (SGOT) 13 U/L      Alkaline Phosphatase 72 U/L      Total Bilirubin 0.2 mg/dL      eGFR Non African Amer 65 mL/min/1.73      Globulin 3.0 gm/dL      A/G Ratio 1.3 g/dL      BUN/Creatinine Ratio 19.5     Anion Gap 15.1 mmol/L     Narrative:       The MDRD GFR formula is only valid for adults with stable renal function between ages 18 and 70.    Protime-INR [[id number]]  (Normal) Collected:  06/15/17 1442    Specimen:  Blood Updated:  06/15/17 1506     Protime 13.0 Seconds      INR 0.98    Narrative:       Therapeutic Ranges for INR: 2.0-3.0 (PT 20-30)                              2.5-3.5 (PT 25-34)    aPTT [[id number]]  (Normal) Collected:  06/15/17 1442    Specimen:  Blood Updated:  06/15/17 1506     PTT 27.5 seconds     Narrative:       PTT = The equivalent PTT values for the therapeutic range of heparin levels at 0.1 to 0.7 U/ml are 53 to 110 seconds.    Lipase [[id number]]  (Normal) Collected:  06/15/17 1442    Specimen:  Blood Updated:  06/15/17 1510     Lipase 28 U/L     Troponin [[id number]]  (Normal) Collected:  06/15/17 1442    Specimen:  Blood Updated:  06/15/17 1510     Troponin T <0.010 ng/mL     Narrative:       Troponin T Reference Ranges:  Less than 0.03 ng/mL:    Negative for AMI  0.03 to 0.09 ng/mL:      Indeterminant for AMI  Greater than 0.09 ng/mL: Positive for  AMI    CBC Auto Differential [[id number]]  (Abnormal) Collected:  06/15/17 1442    Specimen:  Blood Updated:  06/15/17 1449     WBC 5.87 10*3/mm3      RBC 3.79 (L) 10*6/mm3      Hemoglobin 11.4 (L) g/dL      Hematocrit 35.2 (L) %      MCV 92.9 fL      MCH 30.1 pg      MCHC 32.4 g/dL      RDW 12.4 %      RDW-SD 42.5 fl      MPV 10.0 fL      Platelets 275 10*3/mm3      Neutrophil % 71.8 (H) %      Lymphocyte % 15.0 (L) %      Monocyte % 12.3 (H) %      Eosinophil % 0.3 %      Basophil % 0.3 %      Immature Grans % 0.3 %      Neutrophils, Absolute 4.21 10*3/mm3      Lymphocytes, Absolute 0.88 10*3/mm3      Monocytes, Absolute 0.72 10*3/mm3      Eosinophils, Absolute 0.02 (L) 10*3/mm3      Basophils, Absolute 0.02 10*3/mm3      Immature Grans, Absolute 0.02 10*3/mm3      nRBC 0.0 /100 WBC     Urinalysis With / Culture If Indicated [[id number]]  (Abnormal) Collected:  06/15/17 1513    Specimen:  Urine from Urine, Clean Catch Updated:  06/15/17 1525     Color, UA Yellow     Appearance, UA Clear     pH, UA 7.0     Specific Gravity, UA 1.015     Glucose, UA Negative     Ketones, UA Trace (A)     Bilirubin, UA Negative     Blood, UA Negative     Protein, UA Negative     Leuk Esterase, UA Negative     Nitrite, UA Negative     Urobilinogen, UA 0.2 E.U./dL    Narrative:       Urine microscopic not indicated.        Prescribed on discharge             Medication List      Notice     No changes were made to your prescriptions during this visit.        All lab results, imaging results and other tests were reviewed by Jimenez Segovia MD and unless otherwise specified were found to be unremarkable.      Final diagnoses:   Benign positional vertigo, unspecified laterality            Jimenez Seogvia MD  06/15/17 [id number]

## 2017-07-01 PROBLEM — R42 VERTIGO: Status: ACTIVE | Noted: 2017-01-01

## 2017-07-01 NOTE — ED PROVIDER NOTES
Subjective   History of Present Illness  History of Present Illness    Chief complaint: Dizziness, falls, weakness    Location: Generalized    Quality/Severity:  Moderate to severe weakness    Timing/Duration: Worsening over the past 2 weeks    Modifying Factors: Worse with any attempts at ambulation    Narrative: This patient arrives via EMS for evaluation of worsening dizziness with falls and weakness.  Apparently she has a history of chronic vertigo problems but her daughter tells me that over the past 2 weeks her symptoms have been increasingly worsening to the point that she is no longer able to live independently at home.  The patient does reside in her own home alone where there are multiple caretakers that come and visit her throughout the day.  The patient is usually able to ambulate with her walker but over the past several days her dizziness has prevented her from being able to do so reliably.  She has had multiple falls in the past 2 weeks.  She has sutures in her right lower leg from a fall last week.  She also fell this morning requiring EMS to respond to her house when she pushed her life alert button.  They were able to get her back up into her chair to rest.  However she has not been able to stand up or go to the bathroom or do any of her regular activities of daily living all day because of her dizziness with nausea and vomiting.    Associated Symptoms: Nausea    Review of Systems   Constitutional: Positive for activity change (decreased), appetite change (decreased) and fatigue. Negative for fever.   HENT: Negative.  Negative for facial swelling.    Eyes: Negative for pain and visual disturbance.   Respiratory: Negative for cough, shortness of breath and wheezing.    Cardiovascular: Negative for chest pain.   Gastrointestinal: Positive for constipation, nausea and vomiting. Negative for abdominal pain and diarrhea.   Genitourinary: Negative for dysuria.   Musculoskeletal: Positive for gait problem  and myalgias.   Skin: Positive for wound (right lower leg). Negative for color change.   Neurological: Positive for dizziness, weakness and light-headedness. Negative for seizures, syncope, facial asymmetry and headaches.   Psychiatric/Behavioral: Positive for confusion.   All other systems reviewed and are negative.      Past Medical History:   Diagnosis Date   • Arthritis    • Constipation    • Degenerative disc disease, lumbar    • Dizziness    • Edema extremities    • Hard of hearing    • Hyperlipidemia    • Hypertension    • Inner ear dysfunction    • Macular degeneration        Allergies   Allergen Reactions   • Epinephrine Palpitations       Past Surgical History:   Procedure Laterality Date   • ADENOIDECTOMY     • APPENDECTOMY     • CHOLECYSTECTOMY     • EYE SURGERY     • TONSILLECTOMY     • TOTAL HIP ARTHROPLASTY Right    • TOTAL KNEE ARTHROPLASTY Left        History reviewed. No pertinent family history.    Social History     Social History   • Marital status:      Spouse name: N/A   • Number of children: N/A   • Years of education: N/A     Social History Main Topics   • Smoking status: Passive Smoke Exposure - Never Smoker   • Smokeless tobacco: None   • Alcohol use No   • Drug use: No   • Sexual activity: Not Currently     Other Topics Concern   • None     Social History Narrative         ED Triage Vitals   Temp Heart Rate Resp BP SpO2   07/01/17 1651 07/01/17 1651 07/01/17 1651 07/01/17 1651 07/01/17 1651   98.4 °F (36.9 °C) 79 22 134/53 96 %      Temp src Heart Rate Source Patient Position BP Location FiO2 (%)   07/01/17 1651 07/01/17 1651 07/01/17 1651 07/01/17 1651 --   Oral Monitor Sitting Right arm        Objective   Physical Exam   Constitutional: She appears well-developed and well-nourished. No distress.   HENT:   Head: Normocephalic and atraumatic.   Eyes: EOM are normal. Pupils are equal, round, and reactive to light. Right eye exhibits no discharge. Left eye exhibits no discharge.    Mild nystagmus noted when tracking from patient's left to right   Neck: Normal range of motion. Neck supple. No tracheal deviation present.   Cardiovascular: Normal rate, regular rhythm and intact distal pulses.    Pulmonary/Chest: Effort normal. No respiratory distress. She has no wheezes. She has no rales.   Abdominal: Soft. She exhibits no mass. There is no tenderness. There is no rebound.   Musculoskeletal: Normal range of motion. She exhibits no edema, tenderness or deformity.   Laceration noted to the right inferior lateral lower leg.  Wound margins are well approximated.  Sutures are anchored well.  No induration or erythema apparent   Neurological: She is alert. She exhibits normal muscle tone.   Skin: Skin is warm and dry. No rash noted. She is not diaphoretic. No erythema.   Psychiatric: She has a normal mood and affect. Her behavior is normal.   Patient is pleasant but easily confused and repeats herself at times   Nursing note and vitals reviewed.      EKG           EKG time/Interp time: 1717/1719  Rhythm/Rate: Normal sinus rhythm, 77 bpm  P waves and SC: P waves are present, 160 ms  QRS, axis: 72 ms, normal axis   ST and T waves: No acute ischemic changes evident     Independently interpreted by me contemporaneously with treatment    Results for orders placed or performed during the hospital encounter of 07/01/17   Comprehensive Metabolic Panel   Result Value Ref Range    Glucose 140 (H) 65 - 99 mg/dL    BUN 15 8 - 23 mg/dL    Creatinine 1.29 (H) 0.57 - 1.00 mg/dL    Sodium 128 (L) 136 - 145 mmol/L    Potassium 4.7 3.5 - 5.2 mmol/L    Chloride 90 (L) 98 - 107 mmol/L    CO2 21.3 (L) 22.0 - 29.0 mmol/L    Calcium 8.6 8.2 - 9.6 mg/dL    Total Protein 6.5 6.0 - 8.5 g/dL    Albumin 4.00 3.50 - 5.20 g/dL    ALT (SGPT) 14 5 - 33 U/L    AST (SGOT) 15 5 - 32 U/L    Alkaline Phosphatase 55 40 - 129 U/L    Total Bilirubin 0.2 0.2 - 1.2 mg/dL    eGFR Non African Amer 39 (L) >60 mL/min/1.73    Globulin 2.5 gm/dL     A/G Ratio 1.6 g/dL    BUN/Creatinine Ratio 11.6 7.0 - 25.0    Anion Gap 16.7 mmol/L   Protime-INR   Result Value Ref Range    Protime 13.1 12.1 - 15.0 Seconds    INR 0.99 0.90 - 1.10   Troponin   Result Value Ref Range    Troponin T <0.010 0.000 - 0.030 ng/mL   CBC Auto Differential   Result Value Ref Range    WBC 7.88 4.80 - 10.80 10*3/mm3    RBC 3.70 (L) 4.20 - 5.40 10*6/mm3    Hemoglobin 11.0 (L) 12.0 - 16.0 g/dL    Hematocrit 32.6 (L) 37.0 - 47.0 %    MCV 88.1 81.0 - 99.0 fL    MCH 29.7 27.0 - 31.0 pg    MCHC 33.7 31.0 - 37.0 g/dL    RDW 12.3 11.5 - 14.5 %    RDW-SD 39.8 37.0 - 54.0 fl    MPV 10.0 7.4 - 10.4 fL    Platelets 378 140 - 500 10*3/mm3    Neutrophil % 84.2 (H) 45.0 - 70.0 %    Lymphocyte % 6.0 (L) 20.0 - 45.0 %    Monocyte % 9.1 (H) 3.0 - 8.0 %    Eosinophil % 0.0 0.0 - 4.0 %    Basophil % 0.3 0.0 - 2.0 %    Immature Grans % 0.4 0.0 - 0.5 %    Neutrophils, Absolute 6.64 1.50 - 8.30 10*3/mm3    Lymphocytes, Absolute 0.47 (L) 0.60 - 4.80 10*3/mm3    Monocytes, Absolute 0.72 0.00 - 1.00 10*3/mm3    Eosinophils, Absolute 0.00 (L) 0.10 - 0.30 10*3/mm3    Basophils, Absolute 0.02 0.00 - 0.20 10*3/mm3    Immature Grans, Absolute 0.03 0.00 - 0.03 10*3/mm3    nRBC 0.0 0.0 - 0.0 /100 WBC   Urinalysis With / Culture If Indicated   Result Value Ref Range    Color, UA Yellow Yellow, Straw    Appearance, UA Clear Clear    pH, UA 7.0 4.5 - 8.0    Specific Gravity, UA 1.015 1.003 - 1.030    Glucose, UA Negative Negative    Ketones, UA Trace (A) Negative, 80 mg/dL (3+), >=160 mg/dL (4+)    Bilirubin, UA Negative Negative    Blood, UA Negative Negative    Protein, UA Negative Negative    Leuk Esterase, UA Trace (A) Negative    Nitrite, UA Negative Negative    Urobilinogen, UA 0.2 E.U./dL 0.2 - 1.0 E.U./dL   CK   Result Value Ref Range    Creatine Kinase 116 26 - 142 U/L   Urinalysis, Microscopic Only   Result Value Ref Range    RBC, UA 0-2 (A) None Seen /HPF    WBC, UA 0-2 (A) None Seen /HPF    Bacteria, UA None  Seen None Seen /HPF    Squamous Epithelial Cells, UA 0-2 None Seen, 0-2 /HPF    Hyaline Casts, UA None Seen None Seen /LPF    Methodology Manual Light Microscopy        RADIOLOGY        Study: CT head without contrast    Findings: No acute findings.  Chronic ischemic changes.    Interpreted contemporaneously with treatment by Dr. Major, independently viewed by me        Procedures         ED Course  ED Course   Comment By Time   I have reviewed the EKG, labs and Head CT.  The patient's sodium level is a little bit low at 128 which is consistent with her poor appetite and diet habits lately.  She is reportedly so weak that she is unable to get up on her own or transfer even with that aren't assistance by her side.  Her family expresses tremendous concern about her safety at home and they tell me that even with their assistance they are unable to meet all of her needs.  I'll request an overnight observation tonight for IV fluids and perhaps further assessment by PT/OT tomorrow if available.  May also need to initiate case management consultation to explore long-term placement needs. Joaquin Miranda MD 07/01 1928                  MDM  Number of Diagnoses or Management Options     Amount and/or Complexity of Data Reviewed  Clinical lab tests: reviewed and ordered  Tests in the radiology section of CPT®: ordered and reviewed  Independent visualization of images, tracings, or specimens: yes    Risk of Complications, Morbidity, and/or Mortality  Presenting problems: high  Diagnostic procedures: moderate  Management options: moderate        Final diagnoses:   Vertigo   Nausea and vomiting, intractability of vomiting not specified, unspecified vomiting type   Weakness   Hyponatremia            Joaquin Miranda MD  07/01/17 1936       Joaquin Miranda MD  07/01/17 2000

## 2017-07-02 NOTE — CONSULTS
Patient Identification:  NAME:  Cornelia Marks  Age:  89 y.o.   Sex:  female   :  1926   MRN:  [id number]       Chief complaint: She doesn't have one, reason for consult vertigo    History of present illness:  This patient is a 89-year-old right-handed white female to history of hypertension hyperlipidemia constipation dizziness arthritis macular degeneration and comes in with at least several days history of increased dizziness she describes it is a moving sensation or spinning sensation consistent with vertigo but she doesn't use words she's had associated symptoms of several falls without loss of consciousness duration is been at least the last several days context 89-year-old patient is never had a stroke by report location is not pertinent duration as described modifying factors none she does have a CAT scan that I independently eyeball reviewed and it shows chronic changes only.  Again no double vision headache paresthesias or paralysis or anything that would suggest a true stroke syndrome.      Past medical history:  Past Medical History:   Diagnosis Date   • Arthritis    • Constipation    • Degenerative disc disease, lumbar    • Dizziness    • Edema extremities    • Hard of hearing    • Hyperlipidemia    • Hypertension    • Inner ear dysfunction    • Macular degeneration        Past surgical history:  Past Surgical History:   Procedure Laterality Date   • ADENOIDECTOMY     • APPENDECTOMY     • CHOLECYSTECTOMY     • EYE SURGERY     • TONSILLECTOMY     • TOTAL HIP ARTHROPLASTY Right    • TOTAL KNEE ARTHROPLASTY Left        Allergies:  Epinephrine    Home medications:  Prescriptions Prior to Admission   Medication Sig Dispense Refill Last Dose   • acetaminophen (TYLENOL) 500 MG tablet Take 1,000 mg by mouth 2 (Two) Times a Day As Needed.   Past Week at Unknown time   • amLODIPine (NORVASC) 5 MG tablet Take 5 mg by mouth Daily. On tablet in the afternoon   2017 at 1200   • captopril (CAPOTEN) 50  MG tablet Take 100 mg by mouth 2 (Two) Times a Day.   7/1/2017 at am   • celecoxib (CeleBREX) 200 MG capsule Take 200 mg by mouth Daily.   7/1/2017 at Unknown time   • docusate sodium (COLACE) 100 MG capsule Take 100 mg by mouth Every Night. Two tablets nightly   6/30/2017 at pm   • meclizine (ANTIVERT) 25 MG tablet Take 25 mg by mouth 4 (Four) Times a Day.   7/1/2017 at Unknown time   • Multiple Vitamins-Minerals (CENTRUM SILVER PO) Take  by mouth Daily.   7/1/2017 at Unknown time   • sulfamethoxazole-trimethoprim (BACTRIM DS,SEPTRA DS) 800-160 MG per tablet Take 1 tablet by mouth 2 (Two) Times a Day.   7/1/2017 at Unknown time   • Vitamins-Lipotropics (LIPO-FLAVONOID PLUS PO) Take  by mouth 3 (Three) Times a Day. 2 tablets three time a day   7/1/2017 at Unknown time   • chlordiazePOXIDE (LIBRIUM) 10 MG capsule Take 10 mg by mouth 2 (Two) Times a Day.   6/15/2017 at Unknown time   • furosemide (LASIX) 40 MG tablet Take 40 mg by mouth Daily.   6/15/2017 at Unknown time   • levothyroxine (SYNTHROID) 88 MCG tablet Take 88 mcg by mouth Daily.   6/15/2017 at Unknown time        Hospital medications:    amLODIPine 5 mg Oral Q24H   celecoxib 200 mg Oral Daily   clonazePAM 0.25 mg Oral BID   docusate sodium 100 mg Oral Nightly   lisinopril 20 mg Oral Q24H   methylPREDNISolone sodium succinate 60 mg Intravenous Daily   multivitamin with minerals 1 tablet Oral Daily   pantoprazole 40 mg Oral Q AM   sulfamethoxazole-trimethoprim 1 tablet Oral BID       sodium chloride 100 mL/hr Last Rate: 100 mL/hr (07/02/17 1134)     •  acetaminophen  •  ondansetron  •  sodium chloride  •  Insert peripheral IV **AND** sodium chloride    Family history:  History reviewed. No pertinent family history.    Social history:  Social History   Substance Use Topics   • Smoking status: Never Smoker   • Smokeless tobacco: None   • Alcohol use No       Review of systems:    Has had a lot of trouble with her left ear her entire life decreased hearing had  pus coming out of it  years ago no hair eyes nose throat skin bone joint  lymphatic hematologic or oncologic complaints no neck pain chest pain abdominal pain bowel bladder incontinence fever chills or rash    Objective:  Vitals Ranges:   Temp:  [98.4 °F (36.9 °C)-99.3 °F (37.4 °C)] 98.5 °F (36.9 °C)  Heart Rate:  [72-85] 85  Resp:  [18-22] 18  BP: (134-176)/(53-75) 176/75      Physical Exam:  Awake alert hard of hearing fund of knowledge poor attention span and concentration poor recent remote memory poor language function dysarthria without a aphasia elderly in no distress pupils 1-1/2 minimal reaction extraocular movements are full with right rotatory nystagmus nasolabial folds palate tongue's medical decreased hearing particular on the left side and tongue protrusion palate and facial expression head turning and shoulder shrug is all normal visual acuity she's able to count fingers but could not check visual field testing motor 4+ of 5 times for some rigidity no bradykinesia some distal atrophy bilateral drift reflexes 1+ symmetrical toes downgoing bilaterally sensation normal light touch face both arms both legs coordination normal in both upper extremities station and gait was not tested for fear of would let her fall heart regular without murmur neck supple without bruits    Results review:   I reviewed the patient's new clinical results.    Data review:  Lab Results (last 24 hours)     Procedure Component Value Units Date/Time    CBC & Differential [[id number]] Collected:  07/01/17 1732    Specimen:  Blood Updated:  07/01/17 1748    Narrative:       The following orders were created for panel order CBC & Differential.  Procedure                               Abnormality         Status                     ---------                               -----------         ------                     CBC Auto Differential[[id number]]        Abnormal            Final result                 Please view results for these  tests on the individual orders.    CBC Auto Differential [[id number]]  (Abnormal) Collected:  07/01/17 1732    Specimen:  Blood Updated:  07/01/17 1748     WBC 7.88 10*3/mm3      RBC 3.70 (L) 10*6/mm3      Hemoglobin 11.0 (L) g/dL      Hematocrit 32.6 (L) %      MCV 88.1 fL      MCH 29.7 pg      MCHC 33.7 g/dL      RDW 12.3 %      RDW-SD 39.8 fl      MPV 10.0 fL      Platelets 378 10*3/mm3      Neutrophil % 84.2 (H) %      Lymphocyte % 6.0 (L) %      Monocyte % 9.1 (H) %      Eosinophil % 0.0 %      Basophil % 0.3 %      Immature Grans % 0.4 %      Neutrophils, Absolute 6.64 10*3/mm3      Lymphocytes, Absolute 0.47 (L) 10*3/mm3      Monocytes, Absolute 0.72 10*3/mm3      Eosinophils, Absolute 0.00 (L) 10*3/mm3      Basophils, Absolute 0.02 10*3/mm3      Immature Grans, Absolute 0.03 10*3/mm3      nRBC 0.0 /100 WBC     Troponin [[id number]]  (Normal) Collected:  07/01/17 1732    Specimen:  Blood Updated:  07/01/17 1802     Troponin T <0.010 ng/mL     Narrative:       Troponin T Reference Ranges:  Less than 0.03 ng/mL:    Negative for AMI  0.03 to 0.09 ng/mL:      Indeterminant for AMI  Greater than 0.09 ng/mL: Positive for AMI    CK [[id number]]  (Normal) Collected:  07/01/17 1732    Specimen:  Blood Updated:  07/01/17 1802     Creatine Kinase 116 U/L     Protime-INR [[id number]]  (Normal) Collected:  07/01/17 1732    Specimen:  Blood Updated:  07/01/17 1804     Protime 13.1 Seconds      INR 0.99    Narrative:       Therapeutic Ranges for INR: 2.0-3.0 (PT 20-30)                              2.5-3.5 (PT 25-34)    Comprehensive Metabolic Panel [[id number]]  (Abnormal) Collected:  07/01/17 1732    Specimen:  Blood Updated:  07/01/17 1806     Glucose 140 (H) mg/dL      BUN 15 mg/dL      Creatinine 1.29 (H) mg/dL      Sodium 128 (L) mmol/L      Potassium 4.7 mmol/L      Chloride 90 (L) mmol/L      CO2 21.3 (L) mmol/L      Calcium 8.6 mg/dL      Total Protein 6.5 g/dL      Albumin 4.00 g/dL      ALT (SGPT) 14 U/L      AST  (SGOT) 15 U/L      Alkaline Phosphatase 55 U/L      Total Bilirubin 0.2 mg/dL      eGFR Non African Amer 39 (L) mL/min/1.73      Globulin 2.5 gm/dL      A/G Ratio 1.6 g/dL      BUN/Creatinine Ratio 11.6     Anion Gap 16.7 mmol/L     Narrative:       The MDRD GFR formula is only valid for adults with stable renal function between ages 18 and 70.    Urinalysis With / Culture If Indicated [[id number]]  (Abnormal) Collected:  07/01/17 1842    Specimen:  Urine from Urine, Catheter Updated:  07/01/17 1900     Color, UA Yellow     Appearance, UA Clear     pH, UA 7.0     Specific Gravity, UA 1.015     Glucose, UA Negative     Ketones, UA Trace (A)     Bilirubin, UA Negative     Blood, UA Negative     Protein, UA Negative     Leuk Esterase, UA Trace (A)     Nitrite, UA Negative     Urobilinogen, UA 0.2 E.U./dL    Urinalysis, Microscopic Only [[id number]]  (Abnormal) Collected:  07/01/17 1842    Specimen:  Urine from Urine, Catheter Updated:  07/01/17 1909     RBC, UA 0-2 (A) /HPF      WBC, UA 0-2 (A) /HPF      Bacteria, UA None Seen /HPF      Squamous Epithelial Cells, UA 0-2 /HPF      Hyaline Casts, UA None Seen /LPF      Methodology Manual Light Microscopy    Troponin [[id number]]  (Normal) Collected:  07/02/17 0000    Specimen:  Blood Updated:  07/02/17 0055     Troponin T <0.010 ng/mL     Narrative:       Troponin T Reference Ranges:  Less than 0.03 ng/mL:    Negative for AMI  0.03 to 0.09 ng/mL:      Indeterminant for AMI  Greater than 0.09 ng/mL: Positive for AMI    CBC (No Diff) [[id number]]  (Abnormal) Collected:  07/02/17 0735    Specimen:  Blood Updated:  07/02/17 0814     WBC 4.99 10*3/mm3      RBC 3.43 (L) 10*6/mm3      Hemoglobin 10.1 (L) g/dL      Hematocrit 30.4 (L) %      MCV 88.6 fL      MCH 29.4 pg      MCHC 33.2 g/dL      RDW 12.4 %      RDW-SD 40.4 fl      MPV 10.4 fL      Platelets 303 10*3/mm3     Comprehensive Metabolic Panel [[id number]]  (Abnormal) Collected:  07/02/17 0735    Specimen:  Blood Updated:   07/02/17 0836     Glucose 87 mg/dL      BUN 11 mg/dL      Creatinine 1.04 (H) mg/dL      Sodium 133 (L) mmol/L      Potassium 4.3 mmol/L      Chloride 98 mmol/L      CO2 22.2 mmol/L      Calcium 8.1 (L) mg/dL      Total Protein 5.8 (L) g/dL      Albumin 3.60 g/dL      ALT (SGPT) 12 U/L      AST (SGOT) 16 U/L      Alkaline Phosphatase 47 U/L      Total Bilirubin 0.2 mg/dL      eGFR Non African Amer 50 (L) mL/min/1.73      Globulin 2.2 gm/dL      A/G Ratio 1.6 g/dL      BUN/Creatinine Ratio 10.6     Anion Gap 12.8 mmol/L     Narrative:       The MDRD GFR formula is only valid for adults with stable renal function between ages 18 and 70.    Troponin [[id number]]  (Normal) Collected:  07/02/17 0735    Specimen:  Blood Updated:  07/02/17 0846     Troponin T <0.010 ng/mL     Narrative:       Troponin T Reference Ranges:  Less than 0.03 ng/mL:    Negative for AMI  0.03 to 0.09 ng/mL:      Indeterminant for AMI  Greater than 0.09 ng/mL: Positive for AMI    TSH [[id number]]  (Abnormal) Collected:  07/02/17 0735    Specimen:  Blood Updated:  07/02/17 0846     TSH 4.560 (H) mIU/mL            Imaging:  Imaging Results (last 24 hours)     Procedure Component Value Units Date/Time    CT Head Without Contrast [[id number]] Collected:  07/02/17 0757     Updated:  07/02/17 0802    Narrative:       UNENHANCED HEAD CT 07/01/2017     HISTORY:  Frequent falls last several weeks with dizziness, fall last night.     COMPARISON: 06/15/2017 unenhanced head CT     TECHNIQUE:  Axial unenhanced head CT Radiation dose reduction techniques were  utilized, including automated exposure control and exposure modulation  based on body size.     FINDINGS:  Volume loss and nonspecific chronic small vessel type white matter  change are redemonstrated and stable. There is no hemorrhage or  hydrocephalus or extra-axial fluid collection. The extracranial soft  tissues are unremarkable. The skull base and calvarium are normal except  for intracranial  atherosclerotic vascular calcifications.       Impression:       Chronic changes as above, no acute abnormality, no interval  change since 06/15/2017.     This report was finalized on 7/2/2017 8:00 AM by Dr. Jamie Bernal MD.                Assessment and Plan:   Left peripheral vestibulopathy.  I will initiate Klonopin 0.25 mg by mouth twice a day and Solu-Medrol 80 mg IV daily.  I do not see evidence of a stroke TIA but I will initiate 181 mg aspirin per day I did perform an independent eyeball review the CAT scan and it shows chronic changes only.      Amish Lopez MD  07/02/17  12:41 PM

## 2017-07-02 NOTE — THERAPY EVALUATION
Acute Care - Physical Therapy Initial Evaluation   Emily Trinh     Patient Name: Cornelia Marks  : 1926  MRN: [id number]  Today's Date: 2017   Onset of Illness/Injury or Date of Surgery Date: 17  Date of Referral to PT: 17  Referring Physician: Dr. Hussein      Admit Date: 2017     Visit Dx:    ICD-10-CM ICD-9-CM   1. Vertigo R42 780.4   2. Nausea and vomiting, intractability of vomiting not specified, unspecified vomiting type R11.2 787.01   3. Weakness R53.1 780.79   4. Hyponatremia E87.1 276.1     Patient Active Problem List   Diagnosis   • Vertigo     Past Medical History:   Diagnosis Date   • Arthritis    • Constipation    • Degenerative disc disease, lumbar    • Dizziness    • Edema extremities    • Hard of hearing    • Hyperlipidemia    • Hypertension    • Inner ear dysfunction    • Macular degeneration      Past Surgical History:   Procedure Laterality Date   • ADENOIDECTOMY     • APPENDECTOMY     • CHOLECYSTECTOMY     • EYE SURGERY     • TONSILLECTOMY     • TOTAL HIP ARTHROPLASTY Right    • TOTAL KNEE ARTHROPLASTY Left           PT ASSESSMENT (last 72 hours)      PT Evaluation       17 0900 17 2119    Rehab Evaluation    Document Type evaluation  -GC     Subjective Information agree to therapy;complains of;weakness;dizziness;nausea/vomiting   constipation  -GC     Patient Effort, Rehab Treatment adequate  -     Symptoms Noted During/After Treatment dizziness  -     General Information    Patient Profile Review yes  -GC     Onset of Illness/Injury or Date of Surgery Date 17  -     Referring Physician Dr. Hussein  -     General Observations Pt seen supine in bed with IV right UE and daughter in room.  -GC     Pertinent History Of Current Problem Pt's daughter reports pt has a long history of vertigo, but the problem has gotten worse over the last several weeks with increasing falls. She has also developed nausea and vomiting and was admitted through the ED at  [facility] 7/1/2017.   -     Precautions/Limitations fall precautions  -     Equipment Currently Used at Home rollator  -     Plans/Goals Discussed With patient and family;agreed upon  -     Risks Reviewed patient and family:;LOB;nausea/vomiting;dizziness  -     Benefits Reviewed patient and family:;improve function;increase independence;increase strength;increase balance  -     Barriers to Rehab medically complex  -     Living Environment    Lives With alone   2 daughters check on her   - alone  -    Living Arrangements house  - house  -SW    Home Accessibility  bed and bath on same level;stairs to enter home  -    Stair Railings at Home outside, present at both sides  - outside, present at both sides  -    Type of Financial/Environmental Concern  none  -    Transportation Available  car  -    Clinical Impression    Date of Referral to PT 07/01/17  -     PT Diagnosis generalized weakness  -     Prognosis fair  -     Patient/Family Goals Statement Pt wants to return home, daughter wants her mom to get the help she needs  -     Impairments Found (describe specific impairments) gait, locomotion, and balance  -     Rehab Potential fair, will monitor progress closely  -     Predicted Duration of Therapy Intervention (days/wks) 5 days  -     Pain Assessment    Pain Assessment No/denies pain  -     ROM (Range of Motion)    General ROM Detail General LE AROM is WFL bilateral hips, knees, ankles  -     MMT (Manual Muscle Testing)    General MMT Assessment Detail General LE strength is WFL bilateral hips, knees, ankles  -     Bed Mobility, Assessment/Treatment    Bed Mobility, Scoot/Bridge, [place] contact guard assist  -     Bed Mob, Supine to Sit, [place] minimum assist (75% patient effort)  -     Bed Mobility, Comment Pt requires rest/reset after change of position due to dizziness  -     Transfer Assessment/Treatment    Transfers, Sit-Stand [place] minimum  assist (75% patient effort)  -     Transfers, Stand-Sit [place] minimum assist (75% patient effort)  -     Transfers, Sit-Stand-Sit, Assist Device rolling walker  -     Transfer, Comment Pt requires rest/reset after change of positions due to dizziness  -     Gait Assessment/Treatment    Gait, [place] Level moderate assist (50% patient effort)  -     Gait, Assistive Device rolling walker  -     Gait, Distance (Feet) 10  -GC     Gait, Gait Deviations other (see comments)   pt noted to lose balance frequently with gait  -     Gait, Safety Issues balance decreased during turns  -     Gait, Impairments impaired balance  -     Positioning and Restraints    Pre-Treatment Position in bed  -     Post Treatment Position chair  -GC     In Chair notified nsg;reclined;call light within reach;encouraged to call for assist;with family/caregiver  -       07/01/17 2112 07/01/17 2101    General Information    Equipment Currently Used at Home rollator  -     Muscle Tone Assessment    Muscle Tone Assessment  Bilateral Lower Extremities  -SW    Bilateral Lower Extremities Muscle Tone Assessment  moderately decreased tone  -      User Key  (r) = Recorded By, (t) = Taken By, (c) = Cosigned By    Initials Name Provider Type     Jorge Luis Ritchie, PT Physical Therapist    ADRIAN Lo, RN Registered Nurse          Physical Therapy Education     Title: PT OT SLP Therapies (Done)     Topic: Physical Therapy (Done)     Point: Mobility training (Done)    Learning Progress Summary    Learner Readiness Method Response Comment Documented by Status   Patient Acceptance ZANDRA HAAS Discussed miguel for assistance with gait and transfers due to dizziness and possible LOB.  07/02/17 1108 Done   Family Acceptance ZANDRA HAAS Discussed miguel for assistance with gait and transfers due to dizziness and possible LOB.  07/02/17 1108 Done               Point: Precautions (Done)    Learning Progress Summary    Learner Readiness  Method Response Comment Documented by Status   Patient Acceptance E WALDO Discussed need for assistance with activity while hospitalized, but also when discharged due to patient's long standing issues with dizziness and LOB.  07/02/17 1109 Done   Family Acceptance ZANDRA HAAS Discussed need for assistance with activity while hospitalized, but also when discharged due to patient's long standing issues with dizziness and LOB.  07/02/17 1109 Done                      User Key     Initials Effective Dates Name Provider Type Discipline     04/06/17 -  Jorge Luis Ritchie, PT Physical Therapist PT                PT Recommendation and Plan  Anticipated Discharge Disposition: home with assist, placement for care  Planned Therapy Interventions: gait training, bed mobility training, transfer training  PT Frequency: daily, 2 times/day  Plan of Care Review  Plan Of Care Reviewed With: patient, daughter  Outcome Summary/Follow up Plan: Pt evaluation completed- will follow daily while hospitalized for mobility, transfers, and gait training.          IP PT Goals       07/02/17 0900          Bed Mobility PT STG    Bed Mobility PT STG, Date Established 07/02/17  -      Bed Mobility PT STG, Time to Achieve 5 days  -GC      Bed Mobility PT STG, Activity Type all bed mobility  -GC      Bed Mobility PT STG, [place] Level independent  -GC      Transfer Training PT STG    Transfer Training PT STG, Time to Achieve 5 days  -GC      Transfer Training PT STG, Activity Type all transfers  -GC      Transfer Training PT STG, [place] Level supervision required  -GC      Transfer Training PT STG, Assist Device walker, rolling  -GC      Gait Training PT STG    Gait Training Goal PT STG, Date Established 07/02/17  -      Gait Training Goal PT STG, Time to Achieve 5 days  -GC      Gait Training Goal PT STG, [place] Level contact guard assist  -GC      Gait Training Goal PT STG, Assist Device walker, rolling  -GC      Gait Training Goal PT  STG, Distance to Achieve 100 feet  -GC        User Key  (r) = Recorded By, (t) = Taken By, (c) = Cosigned By    Initials Name Provider Type    GARY Ritchie PT Physical Therapist                Outcome Measures       07/02/17 0900          How much help from another person do you currently need...    Turning from your back to your side while in flat bed without using bedrails? 3  -GC      Moving from lying on back to sitting on the side of a flat bed without bedrails? 3  -GC      Moving to and from a bed to a chair (including a wheelchair)? 2  -GC      Standing up from a chair using your arms (e.g., wheelchair, bedside chair)? 3  -GC      Climbing 3-5 steps with a railing? 2  -GC      To walk in hospital room? 2  -GC      AM-PAC 6 Clicks Score 15  -GC        User Key  (r) = Recorded By, (t) = Taken By, (c) = Cosigned By    Initials Name Provider Type    GARY Ritchie PT Physical Therapist           Time Calculation:         PT Charges       07/02/17 1114          Time Calculation    Start Time 0900  -GC      Stop Time 0927  -GC      Time Calculation (min) 27 min  -GC        User Key  (r) = Recorded By, (t) = Taken By, (c) = Cosigned By    Initials Name Provider Type    GARY Ritchie PT Physical Therapist          Therapy Charges for Today     Code Description Service Date Service Provider Modifiers Qty    [id number]  PT EVAL MOD COMPLEXITY 2 7/2/2017 Jorge Luis Ritchie, PT GP 1                 Jorge Luis Ritchie PT  7/2/2017

## 2017-07-02 NOTE — H&P
HOSPITALIST SERVICES     @ [place], [place]                Hospitalist Team    ADMISSION HISTORY AND PHYSICAL    PATIENT:      Patient Care Team:  Ankush De León MD as PCP - General (Family Medicine)    CHIEF COMPLAINT:     Genralized weakness, dizziness and hx of frequent falls      HISTORY OF PRESENT ILLNESS:    Ms. Cornelia Marks is a 89 year old  female who has HTN, HLD, Macular degeneration, DJD and chronic dizziness who was brought to [facility] ED with hx of genralized weakness, dizziness and hx of frequent falls. In the last 2 weeks have become increasingly worse to the point that patient cannot live independently. She feels dizzy and complains of room spinning around her. It is not an acute problem but is chronic and she has been seen by ENT and is currently undergoing vestibular therapy but it does not seem to be helping. The patient does reside in her own home alone where there are multiple caretakers that come and visit her throughout the day. The patient is usually able to ambulate with her walker but over the past several days her dizziness has prevented her from being able to do so reliably. She has had multiple falls in the past 2 weeks. She has sutures in her right lower leg from a fall last week. She also fell this morning requiring EMS to respond to her house when she pushed her life alert button. They were able to get her back up into her chair to rest. However she has not been able to stand up or go to the bathroom or do any of her regular activities of daily living all day because of her dizziness with nausea and vomiting. Patient denies any sx of headache, chest pain, shortness of breath, abdominal pain, dysuria or recent hx of blood in stool.          Past Medical History:   Diagnosis Date   • Arthritis    • Constipation    • Degenerative disc disease, lumbar    • Dizziness    • Edema extremities    • Hard of hearing    • Hyperlipidemia    • Hypertension     • Inner ear dysfunction    • Macular degeneration      Past Surgical History:   Procedure Laterality Date   • ADENOIDECTOMY     • APPENDECTOMY     • CHOLECYSTECTOMY     • EYE SURGERY     • TONSILLECTOMY     • TOTAL HIP ARTHROPLASTY Right    • TOTAL KNEE ARTHROPLASTY Left      History reviewed. No pertinent family history.  Social History   Substance Use Topics   • Smoking status: Never Smoker   • Smokeless tobacco: None   • Alcohol use No     Prescriptions Prior to Admission   Medication Sig Dispense Refill Last Dose   • acetaminophen (TYLENOL) 500 MG tablet Take 1,000 mg by mouth 2 (Two) Times a Day As Needed.   Past Week at Unknown time   • amLODIPine (NORVASC) 5 MG tablet Take 5 mg by mouth Daily. On tablet in the afternoon   6/30/2017 at 1200   • captopril (CAPOTEN) 50 MG tablet Take 100 mg by mouth 2 (Two) Times a Day.   7/1/2017 at am   • celecoxib (CeleBREX) 200 MG capsule Take 200 mg by mouth Daily.   7/1/2017 at Unknown time   • docusate sodium (COLACE) 100 MG capsule Take 100 mg by mouth Every Night. Two tablets nightly   6/30/2017 at pm   • meclizine (ANTIVERT) 25 MG tablet Take 25 mg by mouth 4 (Four) Times a Day.   7/1/2017 at Unknown time   • Multiple Vitamins-Minerals (CENTRUM SILVER PO) Take  by mouth Daily.   7/1/2017 at Unknown time   • sulfamethoxazole-trimethoprim (BACTRIM DS,SEPTRA DS) 800-160 MG per tablet Take 1 tablet by mouth 2 (Two) Times a Day.   7/1/2017 at Unknown time   • Vitamins-Lipotropics (LIPO-FLAVONOID PLUS PO) Take  by mouth 3 (Three) Times a Day. 2 tablets three time a day   7/1/2017 at Unknown time   • chlordiazePOXIDE (LIBRIUM) 10 MG capsule Take 10 mg by mouth 2 (Two) Times a Day.   6/15/2017 at Unknown time   • furosemide (LASIX) 40 MG tablet Take 40 mg by mouth Daily.   6/15/2017 at Unknown time   • levothyroxine (SYNTHROID) 88 MCG tablet Take 88 mcg by mouth Daily.   6/15/2017 at Unknown time     Allergies:  Epinephrine    REVIEW OF SYSTEMS:  Please see the above  "history of present illness for pertinent positives and negatives.  The remainder of the patient's systems have been reviewed and are negative.     Vital Signs  Temp:  [98.4 °F (36.9 °C)] 98.4 °F (36.9 °C)  Heart Rate:  [78-79] 78  Resp:  [18-22] 18  BP: (134-145)/(53-64) 145/64    Flowsheet Rows         First Filed Value    Admission Height  64\" (162.6 cm) Documented at 07/01/2017 1651    Admission Weight  147 lb (66.7 kg) Documented at 07/01/2017 1651           Physical Exam:    PHYSICAL EXAMINATION:    VITAL SIGNS: As per Nurse's notes    GENERAL APPEARANCE: The patient is a well developed, well nourished, , in no acute distress without complaints of shortness of breath, dyspnea or acute pain. Lips and nail beds are pink.    HEENT: Normocephalic, atraumatic. PERRL. The sclerae anicteric and conjunctivae pink and moist. Extraocular muscle movements intact. The oral mucosa, hard and soft palate, tongue and posterior pharynx were normal.     NECK: Supple and symmetric. No masses. No thyromegaly. No tenderness. Trachea central. No carotid bruits. No evidence of JVD.    LYMPH NODES: No palpable lymphadenopathy.    SKIN: Warm, dry and intact. No rash or lesions or wounds or patechiae.    CHEST: Normal AP diameter and normal contour without any kyphoscoliosis.    LUNGS: Clear to auscultation bilaterally. Respiratory expansion equal bilaterally. No wheezes/rales/crackles/rubs.    CARDIOVASCULAR: RRR. S1, S2 normal without murmur/gallop/rub. No S3, S4. The carotid pulses were normal and 2+ bilaterally without bruits. Peripheral pulses were 2+ and symmetric. Capillary refill less than 3 seconds.    ABDOMEN: Soft, non-tender, non-distended. No masses. No rebound/guarding. No hepatosplenomegaly. Normal bowel sounds.     RECTAL: Not done.     EXTREMITIES:  No evidence of cyanosis, clubbing, or edema. No rash or lesions. + pedal pulses. Negative Homans sign bilaterally.     MUSCULOSKELETAL:  Muscle strength and tone " normal.    PSYCHIATRY: Responds appropriately to questions. No evidence of acute anxiety, depression, panic attacks or hallucinations.    MENTAL STATUS EXAMINATION: Neatly dressed, conscious and alert. Speech normal in tone. Pleasant and cooperative. Orientation x3. Thought process, content and insight are normal. Memory without deficits.    NEUROLOGIC: Examination is grossly intact globally with no focal deficits. Cranial nerves II through XII are grossly intact except hearing loss. No motor weakness. No decrease in sensation. No facial asymmetry.          Results Review:    I reviewed the patient's new clinical results.  Lab Results (most recent)     Procedure Component Value Units Date/Time    CBC & Differential [[id number]] Collected:  07/01/17 1732    Specimen:  Blood Updated:  07/01/17 1748    Narrative:       The following orders were created for panel order CBC & Differential.  Procedure                               Abnormality         Status                     ---------                               -----------         ------                     CBC Auto Differential[[id number]]        Abnormal            Final result                 Please view results for these tests on the individual orders.    CBC Auto Differential [[id number]]  (Abnormal) Collected:  07/01/17 1732    Specimen:  Blood Updated:  07/01/17 1748     WBC 7.88 10*3/mm3      RBC 3.70 (L) 10*6/mm3      Hemoglobin 11.0 (L) g/dL      Hematocrit 32.6 (L) %      MCV 88.1 fL      MCH 29.7 pg      MCHC 33.7 g/dL      RDW 12.3 %      RDW-SD 39.8 fl      MPV 10.0 fL      Platelets 378 10*3/mm3      Neutrophil % 84.2 (H) %      Lymphocyte % 6.0 (L) %      Monocyte % 9.1 (H) %      Eosinophil % 0.0 %      Basophil % 0.3 %      Immature Grans % 0.4 %      Neutrophils, Absolute 6.64 10*3/mm3      Lymphocytes, Absolute 0.47 (L) 10*3/mm3      Monocytes, Absolute 0.72 10*3/mm3      Eosinophils, Absolute 0.00 (L) 10*3/mm3      Basophils, Absolute 0.02 10*3/mm3       Immature Grans, Absolute 0.03 10*3/mm3      nRBC 0.0 /100 WBC     Troponin [[id number]]  (Normal) Collected:  07/01/17 1732    Specimen:  Blood Updated:  07/01/17 1802     Troponin T <0.010 ng/mL     Narrative:       Troponin T Reference Ranges:  Less than 0.03 ng/mL:    Negative for AMI  0.03 to 0.09 ng/mL:      Indeterminant for AMI  Greater than 0.09 ng/mL: Positive for AMI    CK [[id number]]  (Normal) Collected:  07/01/17 1732    Specimen:  Blood Updated:  07/01/17 1802     Creatine Kinase 116 U/L     Protime-INR [[id number]]  (Normal) Collected:  07/01/17 1732    Specimen:  Blood Updated:  07/01/17 1804     Protime 13.1 Seconds      INR 0.99    Narrative:       Therapeutic Ranges for INR: 2.0-3.0 (PT 20-30)                              2.5-3.5 (PT 25-34)    Comprehensive Metabolic Panel [[id number]]  (Abnormal) Collected:  07/01/17 1732    Specimen:  Blood Updated:  07/01/17 1806     Glucose 140 (H) mg/dL      BUN 15 mg/dL      Creatinine 1.29 (H) mg/dL      Sodium 128 (L) mmol/L      Potassium 4.7 mmol/L      Chloride 90 (L) mmol/L      CO2 21.3 (L) mmol/L      Calcium 8.6 mg/dL      Total Protein 6.5 g/dL      Albumin 4.00 g/dL      ALT (SGPT) 14 U/L      AST (SGOT) 15 U/L      Alkaline Phosphatase 55 U/L      Total Bilirubin 0.2 mg/dL      eGFR Non African Amer 39 (L) mL/min/1.73      Globulin 2.5 gm/dL      A/G Ratio 1.6 g/dL      BUN/Creatinine Ratio 11.6     Anion Gap 16.7 mmol/L     Narrative:       The MDRD GFR formula is only valid for adults with stable renal function between ages 18 and 70.    Urinalysis With / Culture If Indicated [[id number]]  (Abnormal) Collected:  07/01/17 1842    Specimen:  Urine from Urine, Catheter Updated:  07/01/17 1900     Color, UA Yellow     Appearance, UA Clear     pH, UA 7.0     Specific Gravity, UA 1.015     Glucose, UA Negative     Ketones, UA Trace (A)     Bilirubin, UA Negative     Blood, UA Negative     Protein, UA Negative     Leuk Esterase, UA Trace (A)      Nitrite, UA Negative     Urobilinogen, UA 0.2 E.U./dL    Urinalysis, Microscopic Only [[id number]]  (Abnormal) Collected:  07/01/17 1842    Specimen:  Urine from Urine, Catheter Updated:  07/01/17 1909     RBC, UA 0-2 (A) /HPF      WBC, UA 0-2 (A) /HPF      Bacteria, UA None Seen /HPF      Squamous Epithelial Cells, UA 0-2 /HPF      Hyaline Casts, UA None Seen /LPF      Methodology Manual Light Microscopy          Imaging Results (most recent)     Procedure Component Value Units Date/Time    CT Head Without Contrast [[id number]] Updated:  07/01/17 1855        reviewed    ECG/EMG Results (most recent)     Procedure Component Value Units Date/Time    ECG 12 Lead [[id number]] Collected:  07/01/17 1717     Updated:  07/01/17 2009    Narrative:       RR Interval= 779 ms  SC Interval= 160 ms  QRSD Interval= 72 ms  QT Interval= 384 ms  QTc Interval= 435 ms  Heart Rate= 77 ms  P Axis= 51 deg  QRS Axis= 16 deg  T Wave Axis= 48 deg  I: 40 Axis= 26 deg  T: 40 Axis= 4 deg  ST Axis= 47 deg  SINUS RHYTHM  NO SIGNIFICANT CHANGE FROM PREVIOUS ECG  Electronically Signed by:  Darell Davis ([facility]) 01-Jul-2017 20:07:01  Date and Time of Study: 2017-07-01 17:17:21        reviewed    Assessment/Plan       DIFFERENTIAL DIAGNOSES CONSIDERED FOR CHIEF COMPLAINT:        The following clinical entities were considered in differential diagnosis. The list includes commonly encountered practical probabilities and rare esoteric diagnoses worked out through systemic diagnostic methods used to identify the presence of a disease entity where multiple diagnoses are possible. The decision is reached through either of the following methods: 1) direct confirmatory testing, or 2) a process of elimination, or 3) at least a process of obtaining information that shrinks the “probabilities” of candidate conditions to negligible levels, by using clinical evidence and thus implementing aspects of the hypothetico-deductive method.        DIFFERENTIAL  DIAGNOSIS OF DIZZINESS: 1) Benign paroxysmal positional vertigo, 2) Hyperventilation syndrome, 3) Meniere disease, 4) Migrainous vertigo (vestibular migraine, 5) Orthostatic hypotension, 6) Parkinson disease, 7) Peripheral neuropathy        ASSESSMENT AND PLAN:       SUMMARY:    ?   PROPHYLAXIS:   -Oxygen Saturation: As per Nurses' notes.   -DVT Prophylaxis: On SCDs   -Gastritis Prophylaxis: Pantoprazole    -Constipation Prophylaxis: colace 100 mg po BID   -Immunizations: N/A  -Intake and Output Orders: As per order sheet   -Paige Catheter: Not indicated at this time  -Smoking/ Nicotine issues: N/O      ACTIVITIES:  -Bathroom Privileges: May use bathroom   -Activity: Encouraged to sit up in side chair for 2-4 hours BID   -PT/OT: Physical THerapy consult has been requested for evaluation and ROM exercises     NUTRITION AND FLUIDS:  -Diet/ Nutrition: NPO    -Fluid Status/Electrolytes: NS 1L 100 mL/Hr      SOCIAL ISSUES:   -MRSA SCREEN: As per institutional protocol   -Behavioral/ Agitation Issues: NONE   -Social Issues: Lives at home all alone.   -Occupational Issues: Patient is Retired at this time.   -Code Status: Conditional Code on admission   -Disposition: Request to  for placement    THERAPEUTIC:   ALLERGIES: Epinephrine   ANTIBIOTICS: Bactrim DS for UTI  PAIN MANAGEMENT: N/A  ANTIPYRETIC: Tylenol 650 mg 1 po q4-6 hours for headache or   temp >100 degrees   INSULIN THERAPY: N/A    CHEST PAIN: N/A    NEBULIZER TREATMENT: N/A    ANXIETY: N/A    DEPRESSION: N/A    INSOMNIA: N/A               PLAN:    Labs and diagnostic tests reviewed: Tropo <0.010, , Gluc 140, Na 128, K 4.7, Creat 1.29, PT/INR 13.1/ 0.99. WBC 7.88, Hb 11.0, Plt 378, 84.2 N, 6.0 L    Diagnostic tests reviewed:    EKG  SINUS RHYTHM  NO SIGNIFICANT CHANGE FROM PREVIOUS ECG  Electronically Signed by:  Darell Davis ([facility]) 01-Jul-2017 20:07:01  Date and Time of Study: 2017-07-01 17:17:21               Patient is clinically and  hemodynamically stable    Will be seen by Neurology consultant in the morning    Any new recommendations: As per Dr. Lopez    New Labs ordered: CBC, CMP in AM and serial Troponins    New diagnostic tests ordered: CT Scan Head done and results awaited    Any changes in medications: Will D/C Captopril and start patient on Lisinopril    To continue current management and supportive care    Pain management issues: N/A    Discharge planning issues: Placement in [facility]    Will follow patient closely    Nothing new to add for right now          DIAGNOSES:      PRIMARY DIAGNOSES:    1) Vertigo and Dizziness: This is a chronic problem not acute. Has been evaluated in past. Patient takes Meclizine for relief. Says she has been doing vestibular treatments without much relief. Will request Dr Lopez for his input    2) Generalized weakness: Hx noted    3) Hx of frequent falls: Hx noted    4) Hyponatremia: Na 128. On Normal saline infusion    5) UTI: On oral Bactrim DS    6) HTN: Will change Capoten to Lisinopril. Also on Amlodipine    7) HLD: Not on any statins    8) Sensorineural hearing loss: Noted    9) DJD: On Celecoxib    10) Constipation: On Docusate    11) DVT Prophylaxis: On SCDs  ?     SECONDARY DIAGNOSES:  ?     As above        SURGICAL DIAGNOSES:        As per Problem List          I discussed the patients findings and my recommendations with patient and her family and the are agreeable to current treatment and management plan.     William Hussein MD  07/01/17  9:35 PM          William Hussein M.D., FACP  Internal Medicine/ Hospitalist        Time:

## 2017-07-02 NOTE — PROGRESS NOTES
"Hospitalist Team      Patient Care Team:  Ankush De León MD as PCP - General (Family Medicine)        Chief Complaint:  F/U Vertigo and dizziness    Subjective    Interval History and ROS:     Patient notes she continues to be dizzy and nauseated.  Dizziness is worse when she moves her head.  She would like to try and eat and has had no vomiting.  The patient denies any CP, SOA and is afebrile.  Patient has a h/o chronic problems/infections in the past in her left ear and has hearing loss now.    Objective    Vital Signs  Temp:  [97.4 °F (36.3 °C)-99.3 °F (37.4 °C)] 97.4 °F (36.3 °C)  Heart Rate:  [72-85] 73  Resp:  [18] 18  BP: (131-176)/(52-75) 131/52  Oxygen Therapy  SpO2: 97 %  Pulse Oximetry Type: Intermittent  O2 Device: room air     Flowsheet Rows         First Filed Value    Admission Height  64\" (162.6 cm) Documented at 07/01/2017 1651    Admission Weight  147 lb (66.7 kg) Documented at 07/01/2017 1651            Physical Exam:  Physical Exam   Constitutional: She is oriented to person, place, and time. She appears well-developed and well-nourished. No distress.   HENT:   Head: Normocephalic and atraumatic.   [place] and horizontal nystagmus noted to the right.   Eyes: EOM are normal. No scleral icterus.   Pupils with anisocoria R>L and slightly misshapen. (pateint notes since previous cataract surgery)   Neck: Neck supple. No JVD present.   Cardiovascular: Normal rate, regular rhythm and normal heart sounds.  Exam reveals no gallop and no friction rub.    No murmur heard.  Pulmonary/Chest: Effort normal and breath sounds normal. No respiratory distress. She has no wheezes.   Abdominal: Soft. Bowel sounds are normal. She exhibits no distension. There is no tenderness.   Musculoskeletal: She exhibits no edema.   Neurological: She is alert and oriented to person, place, and time.   Skin: Skin is warm and dry.   Well approxiated wound right lower leg with no surrounding erythema, stitches intact.       Results " Review:     I reviewed the patient's new clinical results.    Lab Results (last 24 hours)     Procedure Component Value Units Date/Time    Troponin [[id number]]  (Normal) Collected:  07/02/17 0000    Specimen:  Blood Updated:  07/02/17 0055     Troponin T <0.010 ng/mL     Narrative:       Troponin T Reference Ranges:  Less than 0.03 ng/mL:    Negative for AMI  0.03 to 0.09 ng/mL:      Indeterminant for AMI  Greater than 0.09 ng/mL: Positive for AMI    CBC (No Diff) [[id number]]  (Abnormal) Collected:  07/02/17 0735    Specimen:  Blood Updated:  07/02/17 0814     WBC 4.99 10*3/mm3      RBC 3.43 (L) 10*6/mm3      Hemoglobin 10.1 (L) g/dL      Hematocrit 30.4 (L) %      MCV 88.6 fL      MCH 29.4 pg      MCHC 33.2 g/dL      RDW 12.4 %      RDW-SD 40.4 fl      MPV 10.4 fL      Platelets 303 10*3/mm3     Comprehensive Metabolic Panel [[id number]]  (Abnormal) Collected:  07/02/17 0735    Specimen:  Blood Updated:  07/02/17 0836     Glucose 87 mg/dL      BUN 11 mg/dL      Creatinine 1.04 (H) mg/dL      Sodium 133 (L) mmol/L      Potassium 4.3 mmol/L      Chloride 98 mmol/L      CO2 22.2 mmol/L      Calcium 8.1 (L) mg/dL      Total Protein 5.8 (L) g/dL      Albumin 3.60 g/dL      ALT (SGPT) 12 U/L      AST (SGOT) 16 U/L      Alkaline Phosphatase 47 U/L      Total Bilirubin 0.2 mg/dL      eGFR Non African Amer 50 (L) mL/min/1.73      Globulin 2.2 gm/dL      A/G Ratio 1.6 g/dL      BUN/Creatinine Ratio 10.6     Anion Gap 12.8 mmol/L     Narrative:       The MDRD GFR formula is only valid for adults with stable renal function between ages 18 and 70.    Troponin [[id number]]  (Normal) Collected:  07/02/17 0735    Specimen:  Blood Updated:  07/02/17 0846     Troponin T <0.010 ng/mL     Narrative:       Troponin T Reference Ranges:  Less than 0.03 ng/mL:    Negative for AMI  0.03 to 0.09 ng/mL:      Indeterminant for AMI  Greater than 0.09 ng/mL: Positive for AMI    TSH [[id number]]  (Abnormal) Collected:  07/02/17 9403    Specimen:   Blood Updated:  07/02/17 0846     TSH 4.560 (H) mIU/mL     T4, Free [[id number]]  (Normal) Collected:  07/02/17 1508    Specimen:  Blood Updated:  07/02/17 1534     Free T4 1.01 ng/dL           Imaging Results (last 24 hours)     Procedure Component Value Units Date/Time    CT Head Without Contrast [[id number]] Collected:  07/02/17 0757     Updated:  07/02/17 0802    Narrative:       UNENHANCED HEAD CT 07/01/2017     HISTORY:  Frequent falls last several weeks with dizziness, fall last night.     COMPARISON: 06/15/2017 unenhanced head CT     TECHNIQUE:  Axial unenhanced head CT Radiation dose reduction techniques were  utilized, including automated exposure control and exposure modulation  based on body size.     FINDINGS:  Volume loss and nonspecific chronic small vessel type white matter  change are redemonstrated and stable. There is no hemorrhage or  hydrocephalus or extra-axial fluid collection. The extracranial soft  tissues are unremarkable. The skull base and calvarium are normal except  for intracranial atherosclerotic vascular calcifications.       Impression:       Chronic changes as above, no acute abnormality, no interval  change since 06/15/2017.     This report was finalized on 7/2/2017 8:00 AM by Dr. Jamie Bernal MD.           ECG/EMG Results (most recent)     Procedure Component Value Units Date/Time    ECG 12 Lead [[id number]] Collected:  07/01/17 1717     Updated:  07/01/17 2009    Narrative:       RR Interval= 779 ms  ID Interval= 160 ms  QRSD Interval= 72 ms  QT Interval= 384 ms  QTc Interval= 435 ms  Heart Rate= 77 ms  P Axis= 51 deg  QRS Axis= 16 deg  T Wave Axis= 48 deg  I: 40 Axis= 26 deg  T: 40 Axis= 4 deg  ST Axis= 47 deg  SINUS RHYTHM  NO SIGNIFICANT CHANGE FROM PREVIOUS ECG  Electronically Signed by:  Darell Davis ([facility]) 01-Jul-2017 20:07:01  Date and Time of Study: 2017-07-01 17:17:21    ECG 12 Lead [[id number]] Collected:  07/02/17 0752     Updated:  07/02/17 1801    Narrative:        RR Interval= 789 ms  WV Interval= 164 ms  QRSD Interval= 78 ms  QT Interval= 388 ms  QTc Interval= 437 ms  Heart Rate= 76 ms  P Axis= 54 deg  QRS Axis= 19 deg  T Wave Axis= 56 deg  I: 40 Axis= 38 deg  T: 40 Axis= 1 deg  ST Axis= deg  SINUS RHYTHM  NO SIGNIFICANT CHANGE FROM PREVIOUS ECG  Electronically Signed by:  Darell Davis ([facility] 02-Jul-2017 17:59:26  Date and Time of Study: 2017-07-02 07:52:37          Medication Review:   I have reviewed the patient's current medication list    Current Facility-Administered Medications:   •  acetaminophen (TYLENOL) tablet 650 mg, 650 mg, Oral, Q6H PRN, William Hussein MD  •  amLODIPine (NORVASC) tablet 5 mg, 5 mg, Oral, Q24H, William Hussein MD, 5 mg at 07/02/17 1349  •  aspirin chewable tablet 81 mg, 81 mg, Oral, Daily, Amish Lopez MD, 81 mg at 07/02/17 1349  •  celecoxib (CeleBREX) capsule 200 mg, 200 mg, Oral, Daily, William Hussein MD, 200 mg at 07/02/17 0849  •  clonazePAM (KlonoPIN) tablet 0.25 mg, 0.25 mg, Oral, BID, Amish Lopez MD, 0.25 mg at 07/02/17 1812  •  docusate sodium (COLACE) capsule 100 mg, 100 mg, Oral, Nightly, William Hussein MD, 100 mg at 07/02/17 2049  •  lisinopril (PRINIVIL,ZESTRIL) tablet 20 mg, 20 mg, Oral, Q24H, William Hussein MD, 20 mg at 07/02/17 0849  •  methylPREDNISolone sodium succinate (SOLU-Medrol) injection 60 mg, 60 mg, Intravenous, Daily, Amish Lopez MD, 60 mg at 07/02/17 1349  •  multivitamin with minerals 1 tablet, 1 tablet, Oral, Daily, William Hussein MD, 1 tablet at 07/02/17 0849  •  ondansetron (ZOFRAN) injection 4 mg, 4 mg, Intravenous, Q6H PRN, William Hussein MD, 4 mg at 07/02/17 0553  •  pantoprazole (PROTONIX) EC tablet 40 mg, 40 mg, Oral, Q AM, William Hussein MD, 40 mg at 07/02/17 0553  •  sodium chloride 0.9 % flush 1-10 mL, 1-10 mL, Intravenous, PRN, William Hussein MD  •  Insert peripheral IV, , , Once **AND** sodium chloride 0.9 % flush 10 mL, 10 mL, Intravenous, PRN, Joaquin Miranda MD, 10 mL at  07/01/17 1729      Assessment/Plan     1) Dizziness and suspected left peripheral vestibulopathy: Patient has been working with PT at [facility] For what sounds like presumed BPPV.  Her symptoms have not improved and only worsened with increased falls at home.  Dr. Lopez consulted here and suspects left peripheral vestibulopathy and has started the patient on Klonopin and given a dose of IV steroids.  CT here was negative. I spoke with Dr. Lopez and doubt this is a stoke or TIA but ASA 81mg was initiated and if symptoms persist patient could undergo MRI to r/o cerebellar infarct (would need outpatient referral for open MRI as patient is very claustrophobic). Also if symptoms persist and MRI negative ENT evaluation would be warrented.    2) Falls at home: Secondary to #1 above.  Daughter unable to care for patient at home in her current state.  She is looking at self pay for NH (may have some VA assistance). [facility] coming tomorrow to speak with her. PT/OT following here.     3) Hyponatremia: Improved with IVFs, Will D/C IVFs, patient tolerating po liquids now, advance diet as tolerated.     4) Recent RLE laceration: On Bactrim DS from ER to prevent infection.  Wound looks good and no s/s of infection.  D/C Abx.     5) HTN:  Lisinopril substituted for Capoten here. Also on home Amlodipine. BP last checked WNL, monitor.     6) HLD: On no meds for this (likely risks out weight benefits in the elderly female)     7) DJD: On Celecoxib, no acute issues.     8) Constipation: On Docusate, no acute issues.    9) CKD stage II:  Creatinine slightly elevated above baseline here at admission.  Suspect secondary to Bactrim which I have D/Adam as patient's BUN is not elevated. Monitor.  Celebrex could also contribute but this is a chronic med and Cr 0.82 in June of this year. Monitor.     10) DVT Prophylaxis:  SCDs    Plan for disposition: Possible LTC placement.  (Daughter looking into self pay)    Olga Lidia Ordoñez,  MD  07/02/17  9:44 PM

## 2017-07-02 NOTE — PLAN OF CARE
Problem: Inpatient Physical Therapy  Goal: Bed Mobility Goal STG- PT    07/02/17 0900   Bed Mobility PT STG   Bed Mobility PT STG, Date Established 07/02/17   Bed Mobility PT STG, Time to Achieve 5 days   Bed Mobility PT STG, Activity Type all bed mobility   Bed Mobility PT STG, [place] Level independent       Goal: Transfer Training Goal 1 STG- PT    07/02/17 0900   Transfer Training PT STG   Transfer Training PT STG, Time to Achieve 5 days   Transfer Training PT STG, Activity Type all transfers   Transfer Training PT STG, [place] Level supervision required   Transfer Training PT STG, Assist Device walker, rolling       Goal: Gait Training Goal STG- PT    07/02/17 0900   Gait Training PT STG   Gait Training Goal PT STG, Time to Achieve 5 days   Gait Training Goal PT STG, [place] Level contact guard assist   Gait Training Goal PT STG, Assist Device walker, rolling   Gait Training Goal PT STG, Distance to Achieve 100 feet         07/02/17 0900   Gait Training PT STG   Gait Training Goal PT STG, Date Established 07/02/17   Gait Training Goal PT STG, Time to Achieve 5 days   Gait Training Goal PT STG, [place] Level contact guard assist   Gait Training Goal PT STG, Assist Device walker, rolling   Gait Training Goal PT STG, Distance to Achieve 100 feet

## 2017-07-02 NOTE — PLAN OF CARE
Problem: Patient Care Overview (Adult)  Goal: Plan of Care Review    07/02/17 1113   Coping/Psychosocial Response Interventions   Plan Of Care Reviewed With patient;daughter   Outcome Evaluation   Outcome Summary/Follow up Plan Pt evaluation completed- will follow daily while hospitalized for mobility, transfers, and gait training.

## 2017-07-02 NOTE — PROGRESS NOTES
Discharge Planning Assessment  LOIDA De Luna     Patient Name: Cornelia Marks  MRN: [id number]  Today's Date: 7/2/2017    Admit Date: 7/1/2017          Discharge Needs Assessment       07/02/17 1252    Living Environment    Lives With alone    Living Environment    Provides Primary Care For no one, unable/limited ability to care for self    Discharge Needs Assessment    Equipment Currently Used at Home cane, quad;rollator   Life Alert--personal response system            Discharge Plan       07/02/17 1253    Case Management/Social Work Plan    Plan Plan to be determined.      Patient/Family In Agreement With Plan yes    Additional Comments Patient in agreement to speak with  with daughter, Marce, at bedside.  She lives alone.  She has a lady that once a week assists with bathing, laundry and housekeeping.  She has two daughters that assist her at times.  She has a rollator walker, quad cane(very rarely uses) and a Life Alert Personal Response system (a necklace and button at bedside).  She has no home health, oxygen, nebulizer machine, CPAP/BIPAP services at home.  Verified home address and phone number listed.  Daughter says she is one of the POAs and she will bring the POA and Living Will paperwork in for it to be scanned into the medical record.  Discussed disposition options.  Per request referral made to Shayna @[facility] for private pay costs.  Shayna will meet with patient and daughter, Marce at bedside on Monday 7/3.  Gave daughter brouchures on private duty assistance, VA assistance, Today's Transitions and Road to Recovery booklets.  Explained observation status; verbalizes understanding.   will continue to follow and assist with appropriate disposition.          Discharge Placement     No information found                Demographic Summary       07/02/17 1240    Referral Information    Admission Type observation            Functional Status     None            Psychosocial     None             Abuse/Neglect     None            Legal     None            Substance Abuse     None            Patient Forms     None          Ely Delgado RN

## 2017-07-02 NOTE — ED NOTES
IV site changed due to IV fluids not infusing because LAC was positional.  Patient continues to c/o nausea.     Mirian Knight RN  07/01/17 2008

## 2017-07-02 NOTE — PLAN OF CARE
Problem: Patient Care Overview (Adult)  Goal: Discharge Needs Assessment  Outcome: Ongoing (interventions implemented as appropriate)    07/01/17 2119 07/02/17 1252 07/02/17 1303   Discharge Needs Assessment   Discharge Planning Comments --  --  Patient in agreement to speak with  with daughter, Marce, at bedside. She lives alone. She has a lady that once a week assists with bathing, laundry and housekeeping. She has two daughters that assist her at times. She has a rollator walker, quad cane(very rarely uses) and a Life Alert Personal Response system (a necklace and button at bedside). She has no home health, oxygen, nebulizer machine, CPAP/BIPAP services at home. Verified home address and phone number listed. Daughter says she is one of the POAs and she will bring the POA and Living Will paperwork in for it to be scanned into the medical record. Discussed disposition options. Per request referral made to Shayna @[place] for private pay costs. Shayna will meet with patient and daughter, Marce at bedside on Monday 7/3. Gave daughter brouchures on private duty assistance, VA assistance, Today's Transitions and Road to Recovery booklets. Explained observation status; verbalizes understanding.  will continue to follow and assist with appropriate disposition.    Living Environment   Transportation Available car --  --    Self-Care   Equipment Currently Used at Home --  cane, quad;rollator  (Life Alert--personal response system) --

## 2017-07-03 NOTE — PROGRESS NOTES
Patient Identification:  NAME:  Cornelia Marks  Age:  89 y.o.   Sex:  female   :  1926   MRN:  [id number]       Chief complaint: Vertigo    History of present illness:  The patient feels her vertigo is better she's constantly repeating herself and has dementia but does appear to be awake and alert and is tolerating the Solu-Medrol as well as the Klonopin.  She was getting Librium at home and the Klonopin is almost certainly replacing the Librium at this point.  The family is upset and wants her to get an MRI of the brain to make sure she did not have a stroke.  I've explained to them that it probably was not a stroke but they note that she has had some change in her gait this week and we all agree that it could possibly be a stroke.  I would note that we are treating her as though she is suffered a new stroke by starting aspirin.  They agree with that      Past medical history:  Past Medical History:   Diagnosis Date   • Arthritis    • Constipation    • Degenerative disc disease, lumbar    • Dizziness    • Edema extremities    • Hard of hearing    • Hyperlipidemia    • Hypertension    • Inner ear dysfunction    • Macular degeneration        Allergies:  Epinephrine    Home medications:  Prescriptions Prior to Admission   Medication Sig Dispense Refill Last Dose   • acetaminophen (TYLENOL) 500 MG tablet Take 1,000 mg by mouth 2 (Two) Times a Day As Needed.   Past Week at Unknown time   • amLODIPine (NORVASC) 5 MG tablet Take 5 mg by mouth Daily. On tablet in the afternoon   2017 at 1200   • captopril (CAPOTEN) 50 MG tablet Take 100 mg by mouth 2 (Two) Times a Day.   2017 at am   • celecoxib (CeleBREX) 200 MG capsule Take 200 mg by mouth Daily.   2017 at Unknown time   • docusate sodium (COLACE) 100 MG capsule Take 100 mg by mouth Every Night. Two tablets nightly   2017 at pm   • meclizine (ANTIVERT) 25 MG tablet Take 25 mg by mouth 4 (Four) Times a Day.   2017 at Unknown time   •  Multiple Vitamins-Minerals (CENTRUM SILVER PO) Take  by mouth Daily.   7/1/2017 at Unknown time   • sulfamethoxazole-trimethoprim (BACTRIM DS,SEPTRA DS) 800-160 MG per tablet Take 1 tablet by mouth 2 (Two) Times a Day.   7/1/2017 at Unknown time   • Vitamins-Lipotropics (LIPO-FLAVONOID PLUS PO) Take  by mouth 3 (Three) Times a Day. 2 tablets three time a day   7/1/2017 at Unknown time   • chlordiazePOXIDE (LIBRIUM) 10 MG capsule Take 10 mg by mouth 2 (Two) Times a Day.   6/15/2017 at Unknown time   • furosemide (LASIX) 40 MG tablet Take 40 mg by mouth Daily.   6/15/2017 at Unknown time   • levothyroxine (SYNTHROID) 88 MCG tablet Take 88 mcg by mouth Daily.   6/15/2017 at Unknown time        Hospital medications:    amLODIPine 5 mg Oral Q24H   aspirin 81 mg Oral Daily   celecoxib 200 mg Oral Daily   clonazePAM 0.25 mg Oral BID   docusate sodium 100 mg Oral Nightly   lisinopril 20 mg Oral Q24H   LORazepam 1 mg Intravenous Once   methylPREDNISolone sodium succinate 60 mg Intravenous Daily   multivitamin with minerals 1 tablet Oral Daily   pantoprazole 40 mg Oral Q AM        •  acetaminophen  •  ondansetron  •  sodium chloride  •  Insert peripheral IV **AND** sodium chloride      Objective:  Vitals Ranges:   Temp:  [97.2 °F (36.2 °C)-98.8 °F (37.1 °C)] 98.8 °F (37.1 °C)  Heart Rate:  [60-73] 60  Resp:  [18] 18  BP: (131-175)/(52-74) 151/62      Physical Exam:  Patient is awake alert repeating herself thinks her vertigo is better extraocular movements are full without nystagmus face is symmetrical moves all 4 extremities well some distal atrophy reflexes trace throughout symmetrical    Results review:   I reviewed the patient's new clinical results.    Data review:  Lab Results (last 24 hours)     Procedure Component Value Units Date/Time    T4, Free [[id number]]  (Normal) Collected:  07/02/17 1508    Specimen:  Blood Updated:  07/02/17 1534     Free T4 1.01 ng/dL     Basic Metabolic Panel [[id number]]  (Abnormal)  Collected:  07/03/17 0410    Specimen:  Blood Updated:  07/03/17 0449     Glucose 104 (H) mg/dL      BUN 11 mg/dL      Creatinine 1.10 (H) mg/dL      Sodium 132 (L) mmol/L      Potassium 4.6 mmol/L      Chloride 98 mmol/L      CO2 23.0 mmol/L      Calcium 7.9 (L) mg/dL      eGFR Non African Amer 47 (L) mL/min/1.73      BUN/Creatinine Ratio 10.0     Anion Gap 11.0 mmol/L     Narrative:       The MDRD GFR formula is only valid for adults with stable renal function between ages 18 and 70.           Imaging:  Imaging Results (last 24 hours)     ** No results found for the last 24 hours. **             Assessment and Plan:     Active Problems:    Vertigo    The vertigo is definitely better per the patient's own admission she still has some confusion and because of her dementia repeats herself incessantly.  I performed an independent I will review the CAT scan and it does not show any acute stroke.  Of course I have gone ahead and started her on one aspirin per day as an anti-stroke measure.  The family including power of  definitely want an MRI scan.  They are aware that it will not change my treatment at this time and that she is claustrophobic and they have also requested that I sedate her for the test.  I've explained its against my best judgment to do so but we will will have some small amount of sedation in hopes of getting her through this procedure.  Again I've explained to everyone that we are definitely treating her as though she is suffered an acute stroke and because of her age and vertigo now I would never trust her on anything stronger than an aspirin a day  In short it is certainly possible she could've suffered a stroke that would affect her gait and cause her to be dizzy and we'll see what the MRI scan shows.  I will also check hemoglobin A1c and lipid panel.      Amish Lopez MD  07/03/17  1:35 PM

## 2017-07-03 NOTE — PROGRESS NOTES
Continued Stay Note  LOIDA De Luna     Patient Name: Cornelia Marks  MRN: [id number]  Today's Date: 7/3/2017    Admit Date: 7/1/2017          Discharge Plan       07/03/17 1417    Case Management/Social Work Plan    Additional Comments Daughter, Marce, spoke with [facility] from [facility] and in agreement for patient to transfer to that facility when medically ready. Per request spoke with son per speaker phone with daughter, Marce present about patient's status and disposition options.  Son also spoke with Dr Lopez per phone and in agreement with plans for a MRI of the brain.  All family in agreement with plans of care at this time.  Informed REENA ALVARES of family discussion.  will continue to follow and assist as needed.       07/03/17 1300    Case Management/Social Work Plan    Additional Comments Recieved call from nurse, Mayra, for  to call and speak with [facility] APS worker ([facility]).  I spoke with German per phone.  German states that she just interviewed patient and daughter at bedside.  APS recieved call prior to patient's admission and they were investigating the referral.  Per Tyler a case will not be opened at this time but their recommendation is for patient not to be at home alone or placement.                 Discharge Codes     None            Ely Delgado RN

## 2017-07-03 NOTE — PLAN OF CARE
Problem: Patient Care Overview (Adult)  Goal: Plan of Care Review  Outcome: Ongoing (interventions implemented as appropriate)    07/03/17 1206   Coping/Psychosocial Response Interventions   Plan Of Care Reviewed With patient   Patient Care Overview   Progress improving   Outcome Evaluation   Outcome Summary/Follow up Plan PT: pt able to ambulate with rolling walker with CG of 2 x 80 feet. Pt c/o dizziness and ringing in her ears during session. Initiated LE HEP. Encouraged pt to not rush with mobilty and to ambulate with assist of staff

## 2017-07-03 NOTE — PLAN OF CARE
Problem: Patient Care Overview (Adult)  Goal: Plan of Care Review    07/03/17 1421   Coping/Psychosocial Response Interventions   Plan Of Care Reviewed With patient   Outcome Evaluation   Outcome Summary/Follow up Plan OT evaluation completed. Pt currently needs assistance with basic adl activity for safety due to vertigo/history of frequent falls. Pt also needs supervison/assist for higher level daily tasks such as medicine management and cooking due to her vision defticits and her cognitive status.         Problem: Inpatient Occupational Therapy  Goal: Transfer Training Goal 1 STG- OT  Outcome: Ongoing (interventions implemented as appropriate)    07/03/17 1421   Transfer Training OT STG   Transfer Training OT STG, Date Established 07/03/17   Transfer Training OT STG, Time to Achieve by discharge   Transfer Training OT STG, Activity Type toilet   Transfer Training OT STG, [place] Level supervision required;verbal cues required   Transfer Training OT STG, Assist Device walker, rolling   Transfer Training OT STG, Additional Goal to raised commode       Goal: Dymanic Standing Balance Goal STG- OT  Outcome: Ongoing (interventions implemented as appropriate)    07/03/17 1421   Dynamic Standing Balance OT STG   Dynamic Standing Balance OT STG, Date Established 07/03/17   Dynamic Standing Balance OT STG, Time to Achieve by discharge   Dynamic Standing Balance OT STG, [place] Level supervision required   Dynamic Standing Balance OT STG, Assist Device assistive Device   Dynamic Standing Balance OT STG, Additional Goal to increase safety with adl activity from standing.

## 2017-07-03 NOTE — CONSULTS
"Adult Nutrition  Assessment/PES    Patient Name:  Cornelia Marks  YOB: 1926  MRN: [id number]  Admit Date:  7/1/2017    Assessment Date:  7/3/2017        Reason for Assessment       07/03/17 1047    Reason for Assessment    Reason For Assessment/Visit nurse/nurse practitioner consult    Identified At Risk By Screening Criteria MST SCORE 2+    Cardiac HTN    Neurological Other (comment)   vertigo            Data Eval/ Notes       07/03/17 1047     Notes    Note spoke w pt and daughter. NKFA. Denies any issue chewing or swallowing. Some nausea. Reports at half of good am meal this am. Daughter reports not big breakfast eater, cereal usually.             Nutrition/Diet History       07/03/17 1050    Nutrition/Diet History    Patient Reported Diet/Restrictions/Preferences regular            Anthropometrics       07/03/17 1050    Anthropometrics    RD Documented Current Weight  66.7 kg (147 lb 0.8 oz)    Anthropometrics (Special Considerations)    RD Calculated BMI (kg/m2) 25.2    Body Mass Index (BMI)    BMI Grade 25 - 29.9 - overweight            Labs/Tests/Procedures/Meds       07/03/17 1051    Labs/Tests/Procedures/Meds    Labs/Tests Review Reviewed;Glucose    Medication Review Reviewed, pertinent;Multivitamin;Steroid            Physical Findings       07/03/17 1055    Physical Findings/Assessment    Additional Documentation Physical Appearance (Group)    Physical Appearance    Skin other (see comments)   r leg wound            Estimated/Assessed Needs       07/03/17 1051    Calculation Measurements    Weight Used For Calculations 66.7 kg (147 lb 0.8 oz)    Height Used for Calculations 1.626 m (5' 4\")    Estimated/Assessed Energy Needs    Energy Need Method McDonough-St [place] Aor    Age 89    RMR (McDonough-St. Jeor Equation) 1067    Activity Factors (McDonough [place])  Confined to bed  1.2    Estimated Kcal Range  1280 kcal     Estimated/Assessed Protein Needs    Weight Used for Protein " Calculation 66.7 kg (147 lb 0.8 oz)    Protein (gm/kg) 1.0    1.0 Gm Protein (gm) 66.7    Estimated Protein Range 67 gm     Estimated/Assessed Fluid Needs    Fluid Need Method RDA method    RDA Method (mL)  1280            Nutrition Prescription Ordered       07/03/17 1052    Nutrition Prescription PO    Current PO Diet Full Liquid    Common Modifiers Cardiac            Evaluation of Received Nutrient/Fluid Intake       07/03/17 1053    Evaluation of Received Nutrient/Fluid Intake    Nutrition Delivered Fluid Evaluation    Fluid Intake Evaluation    Oral Fluid (mL) 120    Total Fluid Intake (mL) 120    % Fluid Needs 9    PO Evaluation    Number of Meals 1    % PO Intake 100              Problem/Interventions:        Problem 1       07/03/17 1103    Nutrition Diagnoses Problem 1    Problem 1 Nutrition Appropriate for Condition at this Time                    Intervention Goal       07/03/17 1104    Intervention Goal    General Maintain nutrition    PO Maintain intake;PO intake (%)    PO Intake % 75 %    Weight Maintain weight            Nutrition Intervention       07/03/17 1104    Nutrition Intervention    RD/Tech Action Advise alternate selection;Interview for preference;Follow Tx progress            Nutrition Prescription       07/03/17 1104    Nutrition Prescription PO    PO Prescription Begin/change diet   advance diet as indicated            Education/Evaluation       07/03/17 1104    Education    Education Other (comment)   Spoke w pt & daughter, no needs at visit. Will follow.     Monitor/Evaluation    Monitor Per protocol;I&O;PO intake;Pertinent labs;Weight        Comments:  Advance diet as indicated   Pt w good po reported and recorded. Agree with MVT with noted skin.  Will cont to follow and monitor.     Electronically signed by:  Erica Appiah RD  07/03/17 11:05 AM

## 2017-07-03 NOTE — PROGRESS NOTES
Continued Stay Note  LOIDA De Luna     Patient Name: Cornelia Marks  MRN: [id number]  Today's Date: 7/3/2017    Admit Date: 7/1/2017          Discharge Plan       07/03/17 1300    Case Management/Social Work Plan    Additional Comments Recieved call from nurse, Mayra, for  to call and speak with German [facility] APS worker ([facility]).  I spoke with German per phone.  German states that she just interviewed patient and daughter at bedside.  APS recieved call prior to patient's admission and they were investigating the referral.  Per Ressie a case will not be opened at this time but their recommendation is for patient not to be at home alone or placement.                 Discharge Codes     None            Ely Delgado RN

## 2017-07-03 NOTE — THERAPY EVALUATION
"Acute Care - Occupational Therapy Initial Evaluation   Emily Trinh     Patient Name: Cornelia Marks  : 1926  MRN: [id number]  Today's Date: 7/3/2017  Onset of Illness/Injury or Date of Surgery Date: 17  Date of Referral to OT: 17  Referring Physician: Tiago    Admit Date: 2017       ICD-10-CM ICD-9-CM   1. Vertigo R42 780.4   2. Nausea and vomiting, intractability of vomiting not specified, unspecified vomiting type R11.2 787.01   3. Weakness R53.1 780.79   4. Hyponatremia E87.1 276.1     Patient Active Problem List   Diagnosis   • Vertigo     Past Medical History:   Diagnosis Date   • Arthritis    • Constipation    • Degenerative disc disease, lumbar    • Dizziness    • Edema extremities    • Hard of hearing    • Hyperlipidemia    • Hypertension    • Inner ear dysfunction    • Macular degeneration      Past Surgical History:   Procedure Laterality Date   • ADENOIDECTOMY     • APPENDECTOMY     • CHOLECYSTECTOMY     • EYE SURGERY     • TONSILLECTOMY     • TOTAL HIP ARTHROPLASTY Right    • TOTAL KNEE ARTHROPLASTY Left           OT ASSESSMENT FLOWSHEET (last 72 hours)      OT Evaluation       17 1425 17 1020 17 0955 17 0916 17 0915       Document Type  evaluation  -SD    Subjective Information  agree to therapy;complains of;dizziness   \"ringing in my ears\"  -SD    Patient Effort, Rehab Treatment  adequate  -SD    Symptoms Noted During/After Treatment  dizziness  -SD       Patient Profile Review  yes  -SD    Onset of Illness/Injury or Date of Surgery Date  17  -SD    Referring Physician  Tiago  -SD    General Observations  Pt was sitting upright in a chair.  Daughter in room.  Pt gave permission to continue with evaluation with daughter present.    -SD    Pertinent History Of Current Problem  Pt was brought to the hospital due to history of frequent falls at home.   Pt has a history of vertigo which has worsened over the past 3 weeks.  Pt has been " "living home alone , but she and her daughter both feel this is no longer manageble due to the patient's vertigo, macular degeneration and pt's cogntive status.  Pt currently needs assistance for basic adl tasks, and she would not be able to safety manage IADL activity due to her cognitive status.  -SD    Precautions/Limitations  fall precautions   vision deficit (macular degeneration) and confusion  -SD    Prior Level of Function  --   pt reports independence with adl's prior to vertigo issues  -SD    Equipment Currently Used at Home  cane, quad;rollator   life alert  -SD    Plans/Goals Discussed With  patient and family;agreed upon  -SD    Risks Reviewed  patient and family:;dizziness;LOB  -SD    Benefits Reviewed  patient and family:;improve function  -SD    Barriers to Rehab  cognitive status;visual deficit;medically complex  -SD       Lives With  alone  -SD    Living Arrangements  house  -SD    Living Environment Comment  Daughters have been checking on the patient frequently and staying with her at times due to her frequent falls.    -SD       Date of Referral to OT  07/01/17  -SD    OT Diagnosis  vertigo  -SD    Prognosis  fair  -SD    Functional Level At Time Of Evaluation  Pt currently needs assistance with basic adl activity for safety due to vertigo/history of frequent falls.  Pt also needs supervison/assist for higher level daily tasks such as medicine management and cooking due to her vision defticits and her cognitive status.   -SD    Impairments Found (describe specific impairments)  arousal, attention, and cognition;gait, locomotion, and balance  -SD    Patient/Family Goals Statement  \"quit falling so much\"  -SD    Criteria for Skilled Therapeutic Interventions Met  yes;treatment indicated  -SD    Rehab Potential  fair, will monitor progress closely  -SD    Therapy Frequency  2-3 times/wk  -SD    Predicted Duration of Therapy Intervention (days/wks)  anticipate discharge from acute care in 1-2 days.  " Pt currently on observation status  -SD    Anticipated Discharge Disposition --   Rec 24 hour supervision upon discharge  -SD --   rec 24 hour supervsion  -SD       Pain Assessment  No/denies pain  -SD       Visual Impairment  other (see comments)   macular degeneration  -SD       Current Cognitive/Communication Assessment  impaired  -SD    Orientation Status  oriented to;person;place  -SD    Follows Commands/Answers Questions  able to follow single-step instructions;75% of the time  -SD    Personal Safety  moderate impairment;decreased awareness, need for assist;decreased awareness, need for safety;decreased insight to deficits  -SD    Personal Safety Interventions  gait belt;fall prevention program maintained;nonskid shoes/slippers when out of bed;supervised activity  -SD       General ROM Detail  BUE rom wfl  -SD       General MMT Assessment Detail  BUE strength wfl  -SD             Transfers, Sit-Stand [place]  contact guard assist;verbal cues required  -SD    Transfers, Stand-Sit [place]  contact guard assist;verbal cues required  -SD    Transfers, Sit-Stand-Sit, Assist Device  rolling walker  -SD    Transfer, Comment         Functional Mobility- Ind. Level  contact guard assist  -SD    Functional Mobility- Device  rolling walker  -SD    Functional Mobility-Distance (Feet)  80  -SD    Functional Mobility- Safety Issues  other (see comments)   verbal cues to avoid environmental barriers   -SD       UB Bathing Assess/Train, [place] Level  set up required;supervision required  -SD       LB Bathing Assess/Train, [place] Level  contact guard assist;set up required  -SD       UB Dressing Assess/Train, [place]  supervision required;set up required  -SD       LB Dressing Assess/Train, [place]  contact guard assist;set up required  -SD       Bilateral Lower Extremities         Planned Therapy Interventions  ADL retraining;transfer training  -SD       Pre-Treatment Position  sitting in  chair/recliner  -SD    Post Treatment Position  chair  -SD    In Chair  sitting;call light within reach;encouraged to call for assist;with family/caregiver  -SD      User Key  (r) = Recorded By, (t) = Taken By, (c) = Cosigned By    Initials Name Effective Dates    KM Jennifer Knight, PTA 08/11/15 -     GC Jorge Luis Ritchie, PT 04/06/17 -     CC Mayra Corona, ROMARIO 06/16/16 -     SD Christian Kimball, OTR 06/22/16 -     TL Preeti Hendrickson, RN 06/16/16 -     SW Maritza Lo, RN 06/16/16 -     KS Ely Delgado, ROMARIO 12/11/15 -            Occupational Therapy Education     Title: PT OT SLP Therapies (Active)     Topic: Occupational Therapy (Active)     Point: ADL training (Active)    Description: Instruct learner(s) on proper safety adaptation and remediation techniques during self care or transfers.   Instruct in proper use of assistive devices.    Learning Progress Summary    Learner Readiness Method Response Comment Documented by Status   Patient Acceptance E NR education regarding OT services and recommendation for assistance/supervison due to patient's vertigo, vision deficts and cognitive status. SD 07/03/17 1420 Active                      User Key     Initials Effective Dates Name Provider Type Discipline    SD 06/22/16 -  Christian Kimball, OTR Occupational Therapist OT                  OT Recommendation and Plan  Anticipated Discharge Disposition:  (Rec 24 hour supervision upon discharge)  Planned Therapy Interventions: ADL retraining, transfer training  Therapy Frequency: 2-3 times/wk  Plan of Care Review  Plan Of Care Reviewed With: patient  Outcome Summary/Follow up Plan: OT evaluation completed.  Pt currently needs assistance with basic adl activity for safety due to vertigo/history of frequent falls.  Pt also needs supervison/assist for higher level daily tasks such as medicine management and cooking due to her vision defticits and her cognitive status.          OT Goals       07/03/17 1429           Transfer Training OT STG    Transfer Training OT STG, Date Established 07/03/17  -SD      Transfer Training OT STG, Time to Achieve by discharge  -SD      Transfer Training OT STG, Activity Type toilet  -SD      Transfer Training OT STG, [place] Level supervision required;verbal cues required  -SD      Transfer Training OT STG, Assist Device walker, rolling  -SD      Transfer Training OT STG, Additional Goal to raised commode  -SD      Dynamic Standing Balance OT STG    Dynamic Standing Balance OT STG, Date Established 07/03/17  -SD      Dynamic Standing Balance OT STG, Time to Achieve by discharge  -SD      Dynamic Standing Balance OT STG, [place] Level supervision required  -SD      Dynamic Standing Balance OT STG, Assist Device assistive Device  -SD      Dynamic Standing Balance OT STG, Additional Goal to increase safety with adl activity from standing.  -SD        User Key  (r) = Recorded By, (t) = Taken By, (c) = Cosigned By    Initials Name Provider Type    JULIETA Kimball OTWILMAN Occupational Therapist                Outcome Measures       07/03/17 1000 07/03/17 0955 07/02/17 0900    How much help from another person do you currently need...    Turning from your back to your side while in flat bed without using bedrails?  3  -KM 3  -GC    Moving from lying on back to sitting on the side of a flat bed without bedrails?  3  -KM 3  -GC    Moving to and from a bed to a chair (including a wheelchair)?  2  -KM 2  -GC    Standing up from a chair using your arms (e.g., wheelchair, bedside chair)?  3  -KM 3  -GC    Climbing 3-5 steps with a railing?  2  -KM 2  -GC    To walk in hospital room?  2  -KM 2  -GC    AM-PAC 6 Clicks Score  15  -KM 15  -GC    How much help from another is currently needed...    Putting on and taking off regular lower body clothing? 3  -SD      Bathing (including washing, rinsing, and drying) 3  -SD      Toileting (which includes using toilet bed pan or urinal) 3  -SD       Putting on and taking off regular upper body clothing 3  -SD      Taking care of personal grooming (such as brushing teeth) 3  -SD      Eating meals 3  -SD      Score 18  -SD      Functional Assessment    Outcome Measure Options AM-PAC 6 Clicks Daily Activity (OT)  -SD AM-PAC 6 Clicks Basic Mobility (PT)  -KM       User Key  (r) = Recorded By, (t) = Taken By, (c) = Cosigned By    Initials Name Provider Type    CONNIE Knight, PTA Physical Therapy Assistant    GARY Ritchie, PT Physical Therapist    JULIETA Kimball OTR Occupational Therapist          Time Calculation:   OT Start Time: 0955  OT Stop Time: 1020  OT Time Calculation (min): 25 min    Therapy Charges for Today     Code Description Service Date Service Provider Modifiers Qty    [id number]  OT SELFCARE CURRENT 7/3/2017 PARK Alberto, CK 1    [id number]  OT SELFCARE PROJECTED 7/3/2017 PARK Alberto, CJ 1    [id number]  OT EVAL MOD COMPLEXITY 2 7/3/2017 PARK Alberto 1          OT G-codes  OT Professional Judgement Used?: Yes (due to balance issues, visual deficits and cognitive deficits)  OT Functional Scales Options: AM-PAC 6 Clicks Daily Activity (OT)  Score: 18  Functional Limitation: Self care  Self Care Current Status (): At least 40 percent but less than 60 percent impaired, limited or restricted  Self Care Goal Status (): At least 20 percent but less than 40 percent impaired, limited or restricted    PARK Stevenson  7/3/2017

## 2017-07-03 NOTE — THERAPY TREATMENT NOTE
Acute Care - Physical Therapy Treatment Note   [place]     Patient Name: Cornelia Marks  : 1926  MRN: [id number]  Today's Date: 7/3/2017  Onset of Illness/Injury or Date of Surgery Date: 17  Date of Referral to PT: 17  Referring Physician: Dr. Hussein    Admit Date: 2017    Visit Dx:    ICD-10-CM ICD-9-CM   1. Vertigo R42 780.4   2. Nausea and vomiting, intractability of vomiting not specified, unspecified vomiting type R11.2 787.01   3. Weakness R53.1 780.79   4. Hyponatremia E87.1 276.1     Patient Active Problem List   Diagnosis   • Vertigo               Adult Rehabilitation Note       17 0955          Rehab Assessment/Intervention    Discipline physical therapy assistant  -KM      Document Type therapy note (daily note)  -KM      Subjective Information agree to therapy;complains of   ears ringing  -KM      Patient Effort, Rehab Treatment adequate  -KM      Symptoms Noted During/After Treatment dizziness  -KM      Precautions/Limitations fall precautions  -KM      Specific Treatment Considerations pt reports she has been falling at home.  Daughter reports she does not feel pt is safe to return home at this time  -KM      Recorded by [KM] Jennifer Knight PTA      Pain Assessment    Pain Assessment No/denies pain  -KM      Recorded by [KM] Jennifer Knight PTA      Vision Assessment/Intervention    Visual Impairment --   daughter reports pt has macular degeneration  -KM      Recorded by [KM] Jennifer Knight PTA      Cognitive Assessment/Intervention    Personal Safety Interventions gait belt;nonskid shoes/slippers when out of bed  -KM      Recorded by [KM] Jennifer Knight PTA      Bed Mobility, Assessment/Treatment    Bed Mobility, Comment pt up in chair  -KM      Recorded by [KM] Jennifer Knight PTA      Transfer Assessment/Treatment    Transfers, Sit-Stand [place] contact guard assist;verbal cues required   1-2 assist- chair following  -KM      Transfers, Stand-Sit [place]  contact guard assist;verbal cues required  -KM      Transfers, Sit-Stand-Sit, Assist Device rolling walker  -KM      Transfer, Comment pt reports dizziness was improved with standing and did not increase with ambulation- reports history of ear problems and thinks this contributes to dizziness  -KM      Recorded by [KM] Jennifer Knight PTA      Gait Assessment/Treatment    Gait, [place] Level contact guard assist;2 person assist required  -KM      Gait, Assistive Device rolling walker  -KM      Gait, Distance (Feet) 80  -KM      Gait, Comment cues to stay up in walker - followed pt with chair  -KM      Recorded by [KM] Jennifer Knight PTA      Therapy Exercises    Bilateral Lower Extremities 10 reps;ankle pumps/circles;LAQ   marching  -KM      Recorded by [KM] Jennifer Knight PTA      Positioning and Restraints    Pre-Treatment Position sitting in chair/recliner  -KM      Post Treatment Position chair  -KM      In Chair reclined;call light within reach;encouraged to call for assist;with family/caregiver;legs elevated  -KM      Recorded by [KM] Jennifer Knight PTA        User Key  (r) = Recorded By, (t) = Taken By, (c) = Cosigned By    Initials Name Effective Dates     Jennifer Knight PTA 08/11/15 -                 IP PT Goals       07/02/17 0900          Bed Mobility PT STG    Bed Mobility PT STG, Date Established 07/02/17  -GC      Bed Mobility PT STG, Time to Achieve 5 days  -GC      Bed Mobility PT STG, Activity Type all bed mobility  -GC      Bed Mobility PT STG, [place] Level independent  -GC      Transfer Training PT STG    Transfer Training PT STG, Time to Achieve 5 days  -GC      Transfer Training PT STG, Activity Type all transfers  -GC      Transfer Training PT STG, [place] Level supervision required  -GC      Transfer Training PT STG, Assist Device walker, rolling  -GC      Gait Training PT STG    Gait Training Goal PT STG, Date Established 07/02/17  -GC      Gait Training Goal PT STG, Time to  Achieve 5 days  -      Gait Training Goal PT STG, [place] Level contact guard assist  -      Gait Training Goal PT STG, Assist Device walker, rolling  -      Gait Training Goal PT STG, Distance to Achieve 100 feet  -        User Key  (r) = Recorded By, (t) = Taken By, (c) = Cosigned By    Initials Name Provider Type     Jorge Luis Greshamgeo, PT Physical Therapist          Physical Therapy Education     Title: PT OT SLP Therapies (Active)     Topic: Physical Therapy (Active)     Point: Mobility training (Done)    Learning Progress Summary    Learner Readiness Method Response Comment Documented by Status   Patient Acceptance NOEMI DE PAZ D VU, DU discussed with pt not rushing with transfers- pt states she tends to be in a hurry.  Encouraged mobility with assist only  07/03/17 1204 Done    Acceptance ZANDRA HAAS Discussed miguel for assistance with gait and transfers due to dizziness and possible LOB.  07/02/17 1108 Done   Family Acceptance NOEMI DE PAZ D VU, DU discussed with pt not rushing with transfers- pt states she tends to be in a hurry.  Encouraged mobility with assist only  07/03/17 1204 Done    Acceptance ZANDRA HAAS Discussed miguel for assistance with gait and transfers due to dizziness and possible LOB.  07/02/17 1108 Done               Point: Precautions (Done)    Learning Progress Summary    Learner Readiness Method Response Comment Documented by Status   Patient Acceptance NOEMI DE PAZ D VU, DU discussed with pt not rushing with transfers- pt states she tends to be in a hurry.  Encouraged mobility with assist only  07/03/17 1204 Done    Acceptance ZANDRA HAAS Discussed need for assistance with activity while hospitalized, but also when discharged due to patient's long standing issues with dizziness and LOB.  07/02/17 1109 Done   Family Acceptance NOEMI DE PAZ D VU, DU discussed with pt not rushing with transfers- pt states she tends to be in a hurry.  Encouraged mobility with assist only  07/03/17 1204 Done    Acceptance ZANDRA HAAS Discussed need  for assistance with activity while hospitalized, but also when discharged due to patient's long standing issues with dizziness and LOB.  07/02/17 1109 Done                      User Key     Initials Effective Dates Name Provider Type Discipline     08/11/15 -  Jennifer Knight PTA Physical Therapy Assistant PT     04/06/17 -  Jorge Luis Ritchie, PT Physical Therapist PT                    PT Recommendation and Plan  Anticipated Discharge Disposition: home with assist, placement for care  Planned Therapy Interventions: gait training, bed mobility training, transfer training  PT Frequency: daily, 2 times/day  Plan of Care Review  Plan Of Care Reviewed With: patient  Progress: improving  Outcome Summary/Follow up Plan: PT: pt able to ambulate with rolling walker with CG of 2 x 80 feet.  Pt c/o dizziness and ringing in her ears during session.  Initiated LE HEP.  Encouraged pt to not rush with mobilty and to ambulate with assist of staff          Outcome Measures       07/03/17 0955 07/02/17 0900       How much help from another person do you currently need...    Turning from your back to your side while in flat bed without using bedrails? 3  -KM 3  -GC     Moving from lying on back to sitting on the side of a flat bed without bedrails? 3  -KM 3  -GC     Moving to and from a bed to a chair (including a wheelchair)? 2  -KM 2  -GC     Standing up from a chair using your arms (e.g., wheelchair, bedside chair)? 3  -KM 3  -GC     Climbing 3-5 steps with a railing? 2  -KM 2  -GC     To walk in hospital room? 2  -KM 2  -GC     AM-PAC 6 Clicks Score 15  -KM 15  -GC     Functional Assessment    Outcome Measure Options AM-PAC 6 Clicks Basic Mobility (PT)  -KM        User Key  (r) = Recorded By, (t) = Taken By, (c) = Cosigned By    Initials Name Provider Type    CONNIE Knight PTA Physical Therapy Assistant     Jorge Luis Ritchie, PT Physical Therapist           Time Calculation:         PT Charges       07/03/17 1208          Time  Calculation    Start Time 0955  -KM      Stop Time 1020  -KM      Time Calculation (min) 25 min  -KM        User Key  (r) = Recorded By, (t) = Taken By, (c) = Cosigned By    Initials Name Provider Type     Jennifer nKight PTA Physical Therapy Assistant          Therapy Charges for Today     Code Description Service Date Service Provider Modifiers Qty    [id number] HC GAIT TRAINING EA 15 MIN 7/3/2017 Jennifer Knight PTA GP 1          PT G-Codes  Outcome Measure Options: AM-PAC 6 Clicks Basic Mobility (PT)    Jennifer Knight PTA  7/3/2017

## 2017-07-03 NOTE — PLAN OF CARE
Problem: Patient Care Overview (Adult)  Goal: Plan of Care Review  Outcome: Ongoing (interventions implemented as appropriate)    07/03/17 0131   Coping/Psychosocial Response Interventions   Plan Of Care Reviewed With patient   Patient Care Overview   Progress no change         Problem: Fall Risk (Adult)  Goal: Absence of Falls  Outcome: Ongoing (interventions implemented as appropriate)

## 2017-07-03 NOTE — PLAN OF CARE
Problem: Fall Risk (Adult)  Goal: Absence of Falls  Outcome: Ongoing (interventions implemented as appropriate)    07/03/17 1120   Fall Risk (Adult)   Absence of Falls making progress toward outcome  (x1 assist with transferring out of bed to chair, pt did well)

## 2017-07-03 NOTE — PROGRESS NOTES
"SERVICE: [facility] HOSPITALIST    CONSULTANTS: cardiology/neurology    CHIEF COMPLAINT: f/u vertigo    SUBJECTIVE: the patient reports her dizziness is much improved.  She requests solid food as she reports she is very hungry.  She reports no bowel movement in 1 week with chronic constipation.  Denies f/c/cough/soa/chest pain/n/v/d/abdominal pain or other new concerns.    OBJECTIVE:    /63 (BP Location: Left arm, Patient Position: Lying)  Pulse 66  Temp 99.4 °F (37.4 °C) (Oral)   Resp 18  Ht 64\" (162.6 cm)  Wt 147 lb (66.7 kg)  SpO2 95%  BMI 25.23 kg/m2    MEDS/LABS REVIEWED AND ORDERED    0.9% sodium chloride for irrigation-mineral oil-glycerin 300 mL Rectal Once   amLODIPine 5 mg Oral Q24H   aspirin 81 mg Oral Daily   bisacodyl 10 mg Rectal Daily   celecoxib 200 mg Oral Daily   clonazePAM 0.25 mg Oral BID   docusate sodium 100 mg Oral Nightly   lisinopril 20 mg Oral Q24H   methylPREDNISolone sodium succinate 60 mg Intravenous Daily   multivitamin with minerals 1 tablet Oral Daily   pantoprazole 40 mg Oral Q AM   polyethylene glycol 17 g Oral Daily     Physical Exam   Constitutional: She is oriented to person, place, and time. She appears well-developed and well-nourished.   HENT:   Head: Normocephalic and atraumatic.   Eyes: EOM are normal. Pupils are equal, round, and reactive to light.   Cardiovascular: Normal rate and regular rhythm.  Exam reveals no gallop and no friction rub.    No murmur heard.  Pulmonary/Chest: Effort normal and breath sounds normal. No respiratory distress. She has no wheezes. She has no rales.   Abdominal: Soft. Bowel sounds are normal. She exhibits distension. There is no tenderness.   High pitched tinkling bowel sounds   Musculoskeletal: She exhibits no edema.   Neurological: She is alert and oriented to person, place, and time.   Skin: Skin is warm and dry. No erythema.   Psychiatric: She has a normal mood and affect. Her behavior is normal. Judgment and " thought content normal.   Vitals reviewed.    LAB/DIAGNOSTICS:    Lab Results (last 24 hours)     Procedure Component Value Units Date/Time    Basic Metabolic Panel [[id number]]  (Abnormal) Collected:  07/03/17 0410    Specimen:  Blood Updated:  07/03/17 0449     Glucose 104 (H) mg/dL      BUN 11 mg/dL      Creatinine 1.10 (H) mg/dL      Sodium 132 (L) mmol/L      Potassium 4.6 mmol/L      Chloride 98 mmol/L      CO2 23.0 mmol/L      Calcium 7.9 (L) mg/dL      eGFR Non African Amer 47 (L) mL/min/1.73      BUN/Creatinine Ratio 10.0     Anion Gap 11.0 mmol/L     Narrative:       The MDRD GFR formula is only valid for adults with stable renal function between ages 18 and 70.        ASSESSMENT/PLAN:    1. Left peripheral vestibulopathy: Dr. Lopez following  Started on Klonopin 0.25 mg orally twice daily and Solu-Medrol 80 mg IV daily as well as 181 mg of aspirin daily.  MRI Brain pending  Monitor    2. Frequent falls  SNF placement pending  [facility] accepted patient  Continue PT/OT    3. Hyponatremia: improved with IV hydration, now tolerating po   Stable at 132, no acute issues    4. Chronic Anemia: no active blood loss noted. Hemoglobin stable at 10.1, baseline 11, likely some dilutional effect with IVFs    5. Subclinical hypothyroidism: TSH elevated, FT4 normal  Recheck 6-8 weeks with Ankush De León MD     6. Hypertension: at goal on amlodipine/lisinopril, no acute issues    7. Hyperlipidemia: no meds due to age, no acute issues  DJD: no acute issues on celebrex  Recent RLE laceration: was on Bactrim DS discontinued yesterday    8. CKD 2: creatinine baseline 0.82, now 1.10  Continue to monitor, may need to d/c celebrex      9. Constipation:   Add miralax  Check abdomen flat/upright  Add SMOG  Advance diet tonight  On Docusate, no acute issues.

## 2017-07-04 NOTE — PROGRESS NOTES
Continued Stay Note  [facility]     Patient Name: Cornelia Marks  MRN: [id number]  Today's Date: 7/4/2017    Admit Date: 7/1/2017          Discharge Plan       07/04/17 1050    Case Management/Social Work Plan    Plan plan dc to [place].    Patient/Family In Agreement With Plan yes              Discharge Codes     None        Expected Discharge Date and Time     Expected Discharge Date Expected Discharge Time    Jul 4, 2017             Yg Perez RN

## 2017-07-04 NOTE — DISCHARGE SUMMARY
Cornelia Marks  5/25/1926  [id number]    Hospitalists Discharge Summary    Date of Admission: 7/1/2017  Date of Discharge:  7/4/2017    Primary Discharge Diagnoses:   1. Left peripheral vestibulopathy   Secondary Discharge Diagnoses:   2. Frequent falls   3. Hyponatremia   4. Chronic Anemia   5. Subclinical hypothyroidism   6. Hypertension   7. Hyperlipidemia   8. CKD 2    9. Constipation  PCP  Patient Care Team:  Ankush De León MD as PCP - General (Family Medicine)    Consults:   Consults     Date and Time Order Name Status Description    7/1/2017 2130 Inpatient Consult to Neurology Completed         Operations and Procedures Performed:     Xr Abdomen Flat & Upright    Result Date: 7/4/2017  Narrative: Acute abdominal series  INDICATION: Constipation for the last 3 days. Bloating.  FINDINGS: Upright view of the chest was obtained in addition to flat and upright views of the abdomen. A comparison is made with 10/04/2011.  Heart is enlarged. There is elevation of the left hemidiaphragm which is chronic. Lung volumes overall are low. I cannot exclude some minimal right upper lobe infiltrate. There is progressive degenerative disease in the right shoulder. There is degenerative disease and scoliosis in the spine. The patient has a right hip arthroplasty. Bowel gas pattern is nonspecific but nonobstructive. Predominantly colonic gas is present. No free air. There is extensive atherosclerotic disease.      Impression: Stable cardiomegaly with chronically elevated left hemidiaphragm. There may be some minimal infiltrate in the right upper lobe. Nonspecific but nonobstructive bowel gas pattern. No free air.  This report was finalized on 7/4/2017 8:41 AM by Dr. Seth Maravilla MD.      Ct Head Without Contrast    Result Date: 7/2/2017  Narrative: UNENHANCED HEAD CT 07/01/2017  HISTORY: Frequent falls last several weeks with dizziness, fall last night.  COMPARISON: 06/15/2017 unenhanced head CT  TECHNIQUE: Axial  unenhanced head CT Radiation dose reduction techniques were utilized, including automated exposure control and exposure modulation based on body size.  FINDINGS: Volume loss and nonspecific chronic small vessel type white matter change are redemonstrated and stable. There is no hemorrhage or hydrocephalus or extra-axial fluid collection. The extracranial soft tissues are unremarkable. The skull base and calvarium are normal except for intracranial atherosclerotic vascular calcifications.      Impression: Chronic changes as above, no acute abnormality, no interval change since 06/15/2017.  This report was finalized on 7/2/2017 8:00 AM by Dr. Jamie Bernal MD.      Ct Head Without Contrast    Result Date: 6/15/2017  Narrative: CT HEAD, NONCONTRAST, 06/15/2017:  HISTORY: 89-year-old female in the ED after head injury. Fell yesterday, striking back of head on kitchen floor. She notes multiple falls over the last three weeks and notes a five week history of dizziness.  TECHNIQUE: CT examination of the head was performed without IV contrast. Radiation dose reduction techniques were utilized, including automated exposure control and exposure modulation based on body size.  COMPARISON: *  CT head, 09/30/2011.  FINDINGS: No acute intracranial abnormality is identified, and there is no visible skull fracture.  Moderate generalized cerebral atrophy with somewhat disproportionate enlargement of the ventricles. Extensive low-attenuation white matter changes are nonspecific but likely related to chronic small vessel disease. Small chronic lacunar infarct in the left thalamus. These findings have not changed significantly since the prior exam.  No evidence of intracranial hemorrhage, mass, mass effect or acute cerebral edema.      Impression: 1. No acute intracranial abnormality. 2. Stable diffuse chronic changes as noted above. 3. No significant change since 09/30/2011.  This report was finalized on 6/15/2017 3:54 PM by   "Derek Fernández MD.      Allergies:  is allergic to epinephrine.    [facility] per report 7/1/17    Discharge Medications:   Cornelia Marks   Home Medication Instructions YEN:[id number]    Printed on:07/04/17 1035   Medication Information                      acetaminophen (TYLENOL) 500 MG tablet  Take 1,000 mg by mouth 2 (Two) Times a Day As Needed.             amLODIPine (NORVASC) 5 MG tablet  Take 5 mg by mouth Daily. On tablet in the afternoon             aspirin 81 MG chewable tablet  Chew 1 tablet Daily.             bisacodyl (DULCOLAX) 10 MG suppository  Insert 1 suppository into the rectum Daily.             celecoxib (CeleBREX) 200 MG capsule  Take 200 mg by mouth Daily.             clonazePAM (KlonoPIN) 0.5 MG tablet  Take 0.5 tablets by mouth 2 (Two) Times a Day for 8 days.             docusate sodium (COLACE) 100 MG capsule  Take 100 mg by mouth Every Night. Two tablets nightly             levothyroxine (SYNTHROID) 88 MCG tablet  Take 88 mcg by mouth Daily.             lisinopril (PRINIVIL,ZESTRIL) 20 MG tablet  Take 1 tablet by mouth Daily.             meclizine (ANTIVERT) 25 MG tablet  Take 25 mg by mouth 4 (Four) Times a Day.             Multiple Vitamins-Minerals (CENTRUM SILVER PO)  Take  by mouth Daily.             pantoprazole (PROTONIX) 40 MG EC tablet  Take 1 tablet by mouth Daily.             polyethylene glycol (MIRALAX) packet  Take 17 g by mouth Daily.             Vitamins-Lipotropics (LIPO-FLAVONOID PLUS PO)  Take  by mouth 3 (Three) Times a Day. 2 tablets three time a day               History of Present Illness: Taken from original HPI on admit per Dr. Hussein:   \"Ms. Cornelia Marks is a 89 year old  female who has HTN, HLD, Macular degeneration, DJD and chronic dizziness who was brought to [facility] ED with hx of genralized weakness, dizziness and hx of frequent falls. In the last 2 weeks have become increasingly worse to the point that patient cannot live " "independently. She feels dizzy and complains of room spinning around her. It is not an acute problem but is chronic and she has been seen by ENT and is currently undergoing vestibular therapy but it does not seem to be helping. The patient does reside in her own home alone where there are multiple caretakers that come and visit her throughout the day. The patient is usually able to ambulate with her walker but over the past several days her dizziness has prevented her from being able to do so reliably. She has had multiple falls in the past 2 weeks. She has sutures in her right lower leg from a fall last week. She also fell this morning requiring EMS to respond to her house when she pushed her life alert button. They were able to get her back up into her chair to rest. However she has not been able to stand up or go to the bathroom or do any of her regular activities of daily living all day because of her dizziness with nausea and vomiting. Patient denies any sx of headache, chest pain, shortness of breath, abdominal pain, dysuria or recent hx of blood in stool.\"    Hospital Course  1. Left peripheral vestibulopathy: Dr. Lopez following  Started on Klonopin 0.25 mg orally twice daily and Solu-Medrol 80 mg IV daily as well as 181 mg of aspirin daily.   MRI Brain result still pending but will not change treatment plan.   Continue Aspirin daily  Will continue Christinpin   F/U Ankush De León MD 1 week  To [facility] today for rehab     2. Frequent falls  SNF placement pending  [facility] accepted patient  Continue PT/OT     3. Hyponatremia:  Stable, asymptomatic. Sodium 132  No further issues     4. Chronic Anemia:   No active blood loss noted.  Hemoglobin stable at 10.1, baseline 11     5. Subclinical hypothyroidism: TSH elevated, FT4 normal  Recheck 6-8 weeks with Ankush De León MD  Resume levothyroxine 88 mcg daily     6. Hypertension: at goal on amlodipine/lisinopril, no acute issues     7. Hyperlipidemia: no " meds due to age, no acute issues  DJD: no acute issues on celebrex  Recent RLE laceration: was on Bactrim DS discontinued yesterday     8. CKD 2: creatinine previous baseline 0.82, now 1.10  Monitor at intervals, may need to d/c celebrex      9. Constipation:   Added Miralax daily  Continue docusate, increase to twice daily  SMOG x 2  Continue aggressive bowel regimen    Last Lab Results:   Lab Results (most recent)     Procedure Component Value Units Date/Time    CBC & Differential [[id number]] Collected:  07/01/17 1732    Specimen:  Blood Updated:  07/01/17 1748    Narrative:       The following orders were created for panel order CBC & Differential.  Procedure                               Abnormality         Status                     ---------                               -----------         ------                     CBC Auto Differential[[id number]]        Abnormal            Final result                 Please view results for these tests on the individual orders.    CBC Auto Differential [[id number]]  (Abnormal) Collected:  07/01/17 1732    Specimen:  Blood Updated:  07/01/17 1748     WBC 7.88 10*3/mm3      RBC 3.70 (L) 10*6/mm3      Hemoglobin 11.0 (L) g/dL      Hematocrit 32.6 (L) %      MCV 88.1 fL      MCH 29.7 pg      MCHC 33.7 g/dL      RDW 12.3 %      RDW-SD 39.8 fl      MPV 10.0 fL      Platelets 378 10*3/mm3      Neutrophil % 84.2 (H) %      Lymphocyte % 6.0 (L) %      Monocyte % 9.1 (H) %      Eosinophil % 0.0 %      Basophil % 0.3 %      Immature Grans % 0.4 %      Neutrophils, Absolute 6.64 10*3/mm3      Lymphocytes, Absolute 0.47 (L) 10*3/mm3      Monocytes, Absolute 0.72 10*3/mm3      Eosinophils, Absolute 0.00 (L) 10*3/mm3      Basophils, Absolute 0.02 10*3/mm3      Immature Grans, Absolute 0.03 10*3/mm3      nRBC 0.0 /100 WBC     Troponin [[id number]]  (Normal) Collected:  07/01/17 1732    Specimen:  Blood Updated:  07/01/17 1802     Troponin T <0.010 ng/mL     Narrative:       Troponin T  Reference Ranges:  Less than 0.03 ng/mL:    Negative for AMI  0.03 to 0.09 ng/mL:      Indeterminant for AMI  Greater than 0.09 ng/mL: Positive for AMI    CK [[id number]]  (Normal) Collected:  07/01/17 1732    Specimen:  Blood Updated:  07/01/17 1802     Creatine Kinase 116 U/L     Protime-INR [[id number]]  (Normal) Collected:  07/01/17 1732    Specimen:  Blood Updated:  07/01/17 1804     Protime 13.1 Seconds      INR 0.99    Narrative:       Therapeutic Ranges for INR: 2.0-3.0 (PT 20-30)                              2.5-3.5 (PT 25-34)    Comprehensive Metabolic Panel [[id number]]  (Abnormal) Collected:  07/01/17 1732    Specimen:  Blood Updated:  07/01/17 1806     Glucose 140 (H) mg/dL      BUN 15 mg/dL      Creatinine 1.29 (H) mg/dL      Sodium 128 (L) mmol/L      Potassium 4.7 mmol/L      Chloride 90 (L) mmol/L      CO2 21.3 (L) mmol/L      Calcium 8.6 mg/dL      Total Protein 6.5 g/dL      Albumin 4.00 g/dL      ALT (SGPT) 14 U/L      AST (SGOT) 15 U/L      Alkaline Phosphatase 55 U/L      Total Bilirubin 0.2 mg/dL      eGFR Non African Amer 39 (L) mL/min/1.73      Globulin 2.5 gm/dL      A/G Ratio 1.6 g/dL      BUN/Creatinine Ratio 11.6     Anion Gap 16.7 mmol/L     Narrative:       The MDRD GFR formula is only valid for adults with stable renal function between ages 18 and 70.    Urinalysis With / Culture If Indicated [[id number]]  (Abnormal) Collected:  07/01/17 1842    Specimen:  Urine from Urine, Catheter Updated:  07/01/17 1900     Color, UA Yellow     Appearance, UA Clear     pH, UA 7.0     Specific Gravity, UA 1.015     Glucose, UA Negative     Ketones, UA Trace (A)     Bilirubin, UA Negative     Blood, UA Negative     Protein, UA Negative     Leuk Esterase, UA Trace (A)     Nitrite, UA Negative     Urobilinogen, UA 0.2 E.U./dL    Urinalysis, Microscopic Only [[id number]]  (Abnormal) Collected:  07/01/17 1842    Specimen:  Urine from Urine, Catheter Updated:  07/01/17 1909     RBC, UA 0-2 (A) /HPF      WBC,  UA 0-2 (A) /HPF      Bacteria, UA None Seen /HPF      Squamous Epithelial Cells, UA 0-2 /HPF      Hyaline Casts, UA None Seen /LPF      Methodology Manual Light Microscopy    Troponin [[id number]]  (Normal) Collected:  07/02/17 0000    Specimen:  Blood Updated:  07/02/17 0055     Troponin T <0.010 ng/mL     Narrative:       Troponin T Reference Ranges:  Less than 0.03 ng/mL:    Negative for AMI  0.03 to 0.09 ng/mL:      Indeterminant for AMI  Greater than 0.09 ng/mL: Positive for AMI    CBC (No Diff) [[id number]]  (Abnormal) Collected:  07/02/17 0735    Specimen:  Blood Updated:  07/02/17 0814     WBC 4.99 10*3/mm3      RBC 3.43 (L) 10*6/mm3      Hemoglobin 10.1 (L) g/dL      Hematocrit 30.4 (L) %      MCV 88.6 fL      MCH 29.4 pg      MCHC 33.2 g/dL      RDW 12.4 %      RDW-SD 40.4 fl      MPV 10.4 fL      Platelets 303 10*3/mm3     Comprehensive Metabolic Panel [[id number]]  (Abnormal) Collected:  07/02/17 0735    Specimen:  Blood Updated:  07/02/17 0836     Glucose 87 mg/dL      BUN 11 mg/dL      Creatinine 1.04 (H) mg/dL      Sodium 133 (L) mmol/L      Potassium 4.3 mmol/L      Chloride 98 mmol/L      CO2 22.2 mmol/L      Calcium 8.1 (L) mg/dL      Total Protein 5.8 (L) g/dL      Albumin 3.60 g/dL      ALT (SGPT) 12 U/L      AST (SGOT) 16 U/L      Alkaline Phosphatase 47 U/L      Total Bilirubin 0.2 mg/dL      eGFR Non African Amer 50 (L) mL/min/1.73      Globulin 2.2 gm/dL      A/G Ratio 1.6 g/dL      BUN/Creatinine Ratio 10.6     Anion Gap 12.8 mmol/L     Narrative:       The MDRD GFR formula is only valid for adults with stable renal function between ages 18 and 70.    Troponin [[id number]]  (Normal) Collected:  07/02/17 0735    Specimen:  Blood Updated:  07/02/17 0846     Troponin T <0.010 ng/mL     Narrative:       Troponin T Reference Ranges:  Less than 0.03 ng/mL:    Negative for AMI  0.03 to 0.09 ng/mL:      Indeterminant for AMI  Greater than 0.09 ng/mL: Positive for AMI    TSH [[id number]]  (Abnormal)  Collected:  07/02/17 0735    Specimen:  Blood Updated:  07/02/17 0846     TSH 4.560 (H) mIU/mL     T4, Free [[id number]]  (Normal) Collected:  07/02/17 1508    Specimen:  Blood Updated:  07/02/17 1534     Free T4 1.01 ng/dL     Basic Metabolic Panel [[id number]]  (Abnormal) Collected:  07/03/17 0410    Specimen:  Blood Updated:  07/03/17 0449     Glucose 104 (H) mg/dL      BUN 11 mg/dL      Creatinine 1.10 (H) mg/dL      Sodium 132 (L) mmol/L      Potassium 4.6 mmol/L      Chloride 98 mmol/L      CO2 23.0 mmol/L      Calcium 7.9 (L) mg/dL      eGFR Non African Amer 47 (L) mL/min/1.73      BUN/Creatinine Ratio 10.0     Anion Gap 11.0 mmol/L     Narrative:       The MDRD GFR formula is only valid for adults with stable renal function between ages 18 and 70.        Imaging Results (most recent)     Procedure Component Value Units Date/Time    CT Head Without Contrast [[id number]] Collected:  07/02/17 0757     Updated:  07/02/17 0802    Narrative:       UNENHANCED HEAD CT 07/01/2017     HISTORY:  Frequent falls last several weeks with dizziness, fall last night.     COMPARISON: 06/15/2017 unenhanced head CT     TECHNIQUE:  Axial unenhanced head CT Radiation dose reduction techniques were  utilized, including automated exposure control and exposure modulation  based on body size.     FINDINGS:  Volume loss and nonspecific chronic small vessel type white matter  change are redemonstrated and stable. There is no hemorrhage or  hydrocephalus or extra-axial fluid collection. The extracranial soft  tissues are unremarkable. The skull base and calvarium are normal except  for intracranial atherosclerotic vascular calcifications.       Impression:       Chronic changes as above, no acute abnormality, no interval  change since 06/15/2017.     This report was finalized on 7/2/2017 8:00 AM by Dr. Jamie Bernal MD.       MRI Brain Without Contrast [[id number]] Updated:  07/03/17 1638    XR Abdomen Flat & Upright [[id number]] Updated:   07/03/17 1720        PROCEDURES: NONE    Condition on Discharge:  Stable    Physical Exam at Discharge  Vital Signs  Temp:  [97.3 °F (36.3 °C)-99.4 °F (37.4 °C)] 97.9 °F (36.6 °C)  Heart Rate:  [60-84] 72  Resp:  [16-18] 17  BP: (124-169)/(56-72) 169/70    Physical Exam:  Physical Exam   Constitutional: Patient appears well-developed and well-nourished and in no acute distress   HEENT:   Head: Normocephalic and atraumatic.   Eyes:  Pupils are equal, round, and reactive to light. EOM are intact. Sclera are anicteric and non-injected.  Mouth and Throat: Patient has moist mucous membranes. Oropharynx is clear of any erythema or exudate.     Neck: Neck supple. No JVD present. No thyromegaly present. No lymphadenopathy present.  Cardiovascular: Regular rate, regular rhythm, S1 normal and S2 normal.  Exam reveals no gallop and no friction rub.  No murmur heard.  Pulmonary/Chest: Lungs are clear to auscultation bilaterally. No respiratory distress. No wheezes. No rhonchi. No rales.   Abdominal: Soft. Bowel sounds are normal. No distension and no mass. There is no hepatosplenomegaly. There is no tenderness.   Extremities: No edema. Pulses are palpable in all 4 extremities.  Neurological: Patient is alert and oriented to person, place, and time. Cranial nerves II-XII are grossly intact with no focal deficits.  Skin: Healing bruises left hip, right flank, non-tender. Skin is warm. No rash noted. Nails show no clubbing.  No cyanosis or erythema.    Discharge Disposition  [place]    Visiting Nurse:    As per facility    Home PT/OT:  As per facility    Home Safety Evaluation:  As per facility    DME  As per facility    Discharge Diet:         Dietary Orders            Start     Ordered    07/03/17 1629  Diet Regular; Thin; Cardiac  Diet Effective Now     Question Answer Comment   Diet Texture / Consistency Regular    Fluid Consistency Thin    Common Modifiers Cardiac        07/03/17 1628        Activity at Discharge:  As  tolerated    Pre-discharge education  Medications, follow up    Follow-up Appointments  No future appointments.  Additional Instructions for the Follow-ups that You Need to Schedule     Discharge Follow-up with PCP    As directed    Follow Up Details:  1 week                 Test Results Pending at Discharge: NONE     GIORGIO Bolanos  07/04/17  10:39 AM    Time: Discharge 30 min (if over 30 minutes give explanation as to why it took greater than 30 minutes)

## 2017-07-04 NOTE — PLAN OF CARE
Problem: Patient Care Overview (Adult)  Goal: Plan of Care Review  Outcome: Ongoing (interventions implemented as appropriate)    07/04/17 0341   Coping/Psychosocial Response Interventions   Plan Of Care Reviewed With patient   Patient Care Overview   Progress improving       Goal: Adult Individualization and Mutuality  Outcome: Ongoing (interventions implemented as appropriate)    07/04/17 0341   Individualization   Patient Specific Preferences none voiced       Goal: Discharge Needs Assessment  Outcome: Ongoing (interventions implemented as appropriate)    07/04/17 0341   Discharge Needs Assessment   Readmission Within The Last 30 Days no previous admission in last 30 days   Self-Care   Equipment Currently Used at Home cane, quad;rollator   Living Environment   Transportation Available family or friend will provide         Problem: Fall Risk (Adult)  Goal: Identify Related Risk Factors and Signs and Symptoms  Outcome: Ongoing (interventions implemented as appropriate)    07/04/17 0341   Fall Risk   Fall Risk: Related Risk Factors age-related changes;confusion/agitation;fatigue/slow reaction;gait/mobility problems;history of falls;sensory deficits;sleep pattern alteration   Fall Risk: Signs and Symptoms presence of risk factors       Goal: Absence of Falls  Outcome: Ongoing (interventions implemented as appropriate)    07/04/17 0341   Fall Risk (Adult)   Absence of Falls making progress toward outcome

## 2017-07-05 NOTE — PLAN OF CARE
Problem: Inpatient Physical Therapy  Goal: Bed Mobility Goal STG- PT  Outcome: Unable to achieve outcome(s) by discharge Date Met:  07/05/17 07/02/17 0900 07/05/17 0810   Bed Mobility PT STG   Bed Mobility PT STG, Date Established 07/02/17 --    Bed Mobility PT STG, Time to Achieve 5 days --    Bed Mobility PT STG, Activity Type all bed mobility --    Bed Mobility PT STG, [place] Level independent --    Bed Mobility PT STG, Outcome --  goal not met  (CG/min)   Bed Mobility PT STG, Reason Goal Not Met --  discharged from facility       Goal: Transfer Training Goal 1 STG- PT  Outcome: Unable to achieve outcome(s) by discharge Date Met:  07/05/17 07/02/17 0900 07/05/17 0810   Transfer Training PT STG   Transfer Training PT STG, Time to Achieve 5 days --    Transfer Training PT STG, Activity Type all transfers --    Transfer Training PT STG, [place] Level supervision required --    Transfer Training PT STG, Assist Device walker, rolling --    Transfer Training PT STG, Outcome --  goal not met  (CG)   Transfer Training PT STG, Reason Goal Not Met --  discharged from facility       Goal: Gait Training Goal STG- PT  Outcome: Unable to achieve outcome(s) by discharge Date Met:  07/05/17 07/02/17 0900 07/05/17 0810   Gait Training PT STG   Gait Training Goal PT STG, Date Established 07/02/17 --    Gait Training Goal PT STG, Time to Achieve 5 days --    Gait Training Goal PT STG, [place] Level contact guard assist --    Gait Training Goal PT STG, Assist Device walker, rolling --    Gait Training Goal PT STG, Distance to Achieve 100 feet --    Gait Training Goal PT STG, Outcome --  goal partially met   Gait Training Goal PT STG, Reason Goal Not Met --  discharged from facility

## 2017-07-05 NOTE — THERAPY DISCHARGE NOTE
Acute Care - Physical Therapy Discharge Summary   Emily Trinh       Patient Name: Cornelia Marks  : 1926  MRN: [id number]    Today's Date: 2017  Onset of Illness/Injury or Date of Surgery Date: 17    Date of Referral to PT: 17  Referring Physician: Tiago      Admit Date: 2017      PT Recommendation and Plan    Visit Dx:    ICD-10-CM ICD-9-CM   1. Vertigo R42 780.4   2. Nausea and vomiting, intractability of vomiting not specified, unspecified vomiting type R11.2 787.01   3. Weakness R53.1 780.79   4. Hyponatremia E87.1 276.1             Outcome Measures       17 1000 17 0955 17 0900    How much help from another person do you currently need...    Turning from your back to your side while in flat bed without using bedrails?  3  -KM 3  -GC    Moving from lying on back to sitting on the side of a flat bed without bedrails?  3  -KM 3  -GC    Moving to and from a bed to a chair (including a wheelchair)?  2  -KM 2  -GC    Standing up from a chair using your arms (e.g., wheelchair, bedside chair)?  3  -KM 3  -GC    Climbing 3-5 steps with a railing?  2  -KM 2  -GC    To walk in hospital room?  2  -KM 2  -GC    AM-PAC 6 Clicks Score  15  -KM 15  -GC    How much help from another is currently needed...    Putting on and taking off regular lower body clothing? 3  -SD      Bathing (including washing, rinsing, and drying) 3  -SD      Toileting (which includes using toilet bed pan or urinal) 3  -SD      Putting on and taking off regular upper body clothing 3  -SD      Taking care of personal grooming (such as brushing teeth) 3  -SD      Eating meals 3  -SD      Score 18  -SD      Functional Assessment    Outcome Measure Options AM-PAC 6 Clicks Daily Activity (OT)  -SD AM-PAC 6 Clicks Basic Mobility (PT)  -KM       User Key  (r) = Recorded By, (t) = Taken By, (c) = Cosigned By    Initials Name Provider Type    CONNIE Knight PTA Physical Therapy Assistant    GARY Ritchie  PT Physical Therapist    JULIETA Kimball, OTR Occupational Therapist                      IP PT Goals       07/05/17 0810 07/02/17 0900       Bed Mobility PT STG    Bed Mobility PT STG, Date Established  07/02/17  -GC     Bed Mobility PT STG, Time to Achieve  5 days  -GC     Bed Mobility PT STG, Activity Type  all bed mobility  -GC     Bed Mobility PT STG, [place] Level  independent  -GC     Bed Mobility PT STG, Outcome goal not met   CG/min  -KM      Bed Mobility PT STG, Reason Goal Not Met discharged from facility  -KM      Transfer Training PT STG    Transfer Training PT STG, Time to Achieve  5 days  -GC     Transfer Training PT STG, Activity Type  all transfers  -GC     Transfer Training PT STG, [place] Level  supervision required  -GC     Transfer Training PT STG, Assist Device  walker, rolling  -GC     Transfer Training PT STG, Outcome goal not met   CG  -KM      Transfer Training PT STG, Reason Goal Not Met discharged from facility  -KM      Gait Training PT STG    Gait Training Goal PT STG, Date Established  07/02/17  -GC     Gait Training Goal PT STG, Time to Achieve  5 days  -GC     Gait Training Goal PT STG, [place] Level  contact guard assist  -GC     Gait Training Goal PT STG, Assist Device  walker, rolling  -GC     Gait Training Goal PT STG, Distance to Achieve  100 feet  -GC     Gait Training Goal PT STG, Outcome goal partially met  -KM      Gait Training Goal PT STG, Reason Goal Not Met discharged from facility  -        User Key  (r) = Recorded By, (t) = Taken By, (c) = Cosigned By    Initials Name Provider Type    CONNIE Knight, PTA Physical Therapy Assistant    GC Jorge Luis Ritchie, PT Physical Therapist        Pt reporting ringing in ears and dizziness on last visit. Mobility limited by symptoms and pt ambulating with rolling walker with assist of 1-2.  Pt lives alone and has been falling a lot.  Family decision to transfer to the [facility] for further care.        PT Discharge  Summary  Anticipated Discharge Disposition: skilled nursing facility  Reason for Discharge: Discharge from facility  Outcomes Achieved: Refer to plan of care for updates on goals achieved  Discharge Destination: SNF      Jennifer Knight, PTA   7/5/2017

## 2018-01-01 ENCOUNTER — OUTSIDE FACILITY SERVICE (OUTPATIENT)
Dept: HOSPITALIST | Facility: HOSPITAL | Age: 83
End: 2018-01-01

## 2018-01-01 ENCOUNTER — HOSPITAL ENCOUNTER (EMERGENCY)
Facility: HOSPITAL | Age: 83
Discharge: HOME | End: 2018-06-13
Attending: EMERGENCY MEDICINE | Admitting: EMERGENCY MEDICINE

## 2018-01-01 ENCOUNTER — APPOINTMENT (OUTPATIENT)
Dept: GENERAL RADIOLOGY | Facility: HOSPITAL | Age: 83
End: 2018-01-01

## 2018-01-01 ENCOUNTER — LAB REQUISITION (OUTPATIENT)
Dept: LAB | Facility: HOSPITAL | Age: 83
End: 2018-01-01

## 2018-01-01 ENCOUNTER — APPOINTMENT (OUTPATIENT)
Dept: CT IMAGING | Facility: HOSPITAL | Age: 83
End: 2018-01-01

## 2018-01-01 ENCOUNTER — HOSPITAL ENCOUNTER (INPATIENT)
Facility: HOSPITAL | Age: 83
LOS: 3 days | End: 2018-06-20
Attending: EMERGENCY MEDICINE | Admitting: HOSPITALIST

## 2018-01-01 ENCOUNTER — APPOINTMENT (OUTPATIENT)
Dept: CARDIOLOGY | Facility: HOSPITAL | Age: 83
End: 2018-01-01
Attending: INTERNAL MEDICINE

## 2018-01-01 ENCOUNTER — APPOINTMENT (OUTPATIENT)
Dept: ULTRASOUND IMAGING | Facility: HOSPITAL | Age: 83
End: 2018-01-01

## 2018-01-01 ENCOUNTER — HOSPITAL ENCOUNTER (INPATIENT)
Facility: HOSPITAL | Age: 83
LOS: 1 days | End: 2018-06-21
Attending: HOSPITALIST | Admitting: HOSPITALIST

## 2018-01-01 VITALS
WEIGHT: 134.7 LBS | TEMPERATURE: 97.8 F | SYSTOLIC BLOOD PRESSURE: 142 MMHG | HEIGHT: 62 IN | HEART RATE: 107 BPM | DIASTOLIC BLOOD PRESSURE: 62 MMHG | OXYGEN SATURATION: 89 % | RESPIRATION RATE: 20 BRPM | BODY MASS INDEX: 24.79 KG/M2

## 2018-01-01 VITALS
HEIGHT: 64 IN | RESPIRATION RATE: 18 BRPM | TEMPERATURE: 97.9 F | SYSTOLIC BLOOD PRESSURE: 218 MMHG | BODY MASS INDEX: 23.05 KG/M2 | WEIGHT: 135 LBS | OXYGEN SATURATION: 94 % | HEART RATE: 78 BPM | DIASTOLIC BLOOD PRESSURE: 87 MMHG

## 2018-01-01 VITALS
BODY MASS INDEX: 25.17 KG/M2 | WEIGHT: 136.8 LBS | HEIGHT: 62 IN | RESPIRATION RATE: 18 BRPM | HEART RATE: 94 BPM | DIASTOLIC BLOOD PRESSURE: 53 MMHG | OXYGEN SATURATION: 95 % | SYSTOLIC BLOOD PRESSURE: 140 MMHG | TEMPERATURE: 97.2 F

## 2018-01-01 DIAGNOSIS — R74.01 ELEVATED AST (SGOT): ICD-10-CM

## 2018-01-01 DIAGNOSIS — R35.0 FREQUENCY OF MICTURITION: ICD-10-CM

## 2018-01-01 DIAGNOSIS — J18.9 PNEUMONIA OF BOTH LUNGS DUE TO INFECTIOUS ORGANISM, UNSPECIFIED PART OF LUNG: Primary | ICD-10-CM

## 2018-01-01 DIAGNOSIS — N39.0 URINARY TRACT INFECTION: ICD-10-CM

## 2018-01-01 DIAGNOSIS — J96.01 ACUTE RESPIRATORY FAILURE WITH HYPOXIA (HCC): ICD-10-CM

## 2018-01-01 DIAGNOSIS — N17.9 ACUTE KIDNEY INJURY (HCC): ICD-10-CM
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
## 2018-01-01 DIAGNOSIS — R77.8 ELEVATED TROPONIN: ICD-10-CM

## 2018-01-01 LAB
ALBUMIN SERPL-MCNC: 2.7 G/DL (ref 3.5–5.2)
ALBUMIN SERPL-MCNC: 2.7 G/DL (ref 3.5–5.2)
ALBUMIN SERPL-MCNC: 3.3 G/DL (ref 3.5–5.2)
ALBUMIN/GLOB SERPL: 0.9 G/DL
ALBUMIN/GLOB SERPL: 1.1 G/DL
ALP SERPL-CCNC: 81 U/L (ref 40–129)
ALP SERPL-CCNC: 92 U/L (ref 40–129)
ALT SERPL W P-5'-P-CCNC: 28 U/L (ref 5–33)
ALT SERPL W P-5'-P-CCNC: 43 U/L (ref 5–33)
AMORPH URATE CRY URNS QL MICRO: ABNORMAL /HPF
AMORPH URATE CRY URNS QL MICRO: ABNORMAL /HPF
ANION GAP SERPL CALCULATED.3IONS-SCNC: 13.9 MMOL/L
ANION GAP SERPL CALCULATED.3IONS-SCNC: 15.6 MMOL/L
ANION GAP SERPL CALCULATED.3IONS-SCNC: 19 MMOL/L
ANION GAP SERPL CALCULATED.3IONS-SCNC: 19.4 MMOL/L
AORTIC ARCH: 2 CM
AORTIC DIMENSIONLESS INDEX: 0.8 (DI)
ARTERIAL PATENCY WRIST A: POSITIVE
ASCENDING AORTA: 2.5 CM
AST SERPL-CCNC: 23 U/L (ref 5–32)
AST SERPL-CCNC: 59 U/L (ref 5–32)
ATMOSPHERIC PRESS: 742 MMHG
B PERT DNA SPEC QL NAA+PROBE: NOT DETECTED
BACTERIA UR QL AUTO: ABNORMAL /HPF
BACTERIA UR QL AUTO: ABNORMAL /HPF
BASE EXCESS BLDA CALC-SCNC: -1.4 MMOL/L (ref 0–2)
BASOPHILS # BLD AUTO: 0.02 10*3/MM3 (ref 0–0.2)
BASOPHILS # BLD AUTO: 0.02 10*3/MM3 (ref 0–0.2)
BASOPHILS # BLD AUTO: 0.03 10*3/MM3 (ref 0–0.2)
BASOPHILS NFR BLD AUTO: 0.2 % (ref 0–2)
BASOPHILS NFR BLD AUTO: 0.3 % (ref 0–2)
BASOPHILS NFR BLD AUTO: 0.3 % (ref 0–2)
BDY SITE: ABNORMAL
BH CV ECHO MEAS - ACS: 1.7 CM
BH CV ECHO MEAS - AO MAX PG (FULL): 2 MMHG
BH CV ECHO MEAS - AO MAX PG: 6 MMHG
BH CV ECHO MEAS - AO MEAN PG (FULL): 2 MMHG
BH CV ECHO MEAS - AO MEAN PG: 4 MMHG
BH CV ECHO MEAS - AO ROOT AREA (BSA CORRECTED): 2
BH CV ECHO MEAS - AO ROOT AREA: 8 CM^2
BH CV ECHO MEAS - AO ROOT DIAM: 3.2 CM
BH CV ECHO MEAS - AO V2 MAX: 122 CM/SEC
BH CV ECHO MEAS - AO V2 MEAN: 89 CM/SEC
BH CV ECHO MEAS - AO V2 VTI: 24 CM
BH CV ECHO MEAS - ASC AORTA: 2.5 CM
BH CV ECHO MEAS - AVA(I,A): 2.6 CM^2
BH CV ECHO MEAS - AVA(I,D): 2.6 CM^2
BH CV ECHO MEAS - AVA(V,A): 2.6 CM^2
BH CV ECHO MEAS - AVA(V,D): 2.6 CM^2
BH CV ECHO MEAS - BSA(HAYCOCK): 1.6 M^2
BH CV ECHO MEAS - BSA: 1.6 M^2
BH CV ECHO MEAS - BZI_BMI: 24.7 KILOGRAMS/M^2
BH CV ECHO MEAS - BZI_METRIC_HEIGHT: 157.5 CM
BH CV ECHO MEAS - BZI_METRIC_WEIGHT: 61.2 KG
BH CV ECHO MEAS - CONTRAST EF (2CH): 43.6 ML/M^2
BH CV ECHO MEAS - CONTRAST EF 4CH: 42.1 ML/M^2
BH CV ECHO MEAS - EDV(CUBED): 84 ML
BH CV ECHO MEAS - EDV(MOD-SP2): 82.1 ML
BH CV ECHO MEAS - EDV(MOD-SP4): 70.7 ML
BH CV ECHO MEAS - EDV(TEICH): 86.8 ML
BH CV ECHO MEAS - EF(CUBED): 49.4 %
BH CV ECHO MEAS - EF(MOD-BP): 44 %
BH CV ECHO MEAS - EF(MOD-SP2): 43.6 %
BH CV ECHO MEAS - EF(MOD-SP4): 42.1 %
BH CV ECHO MEAS - EF(TEICH): 41.8 %
BH CV ECHO MEAS - ESV(CUBED): 42.5 ML
BH CV ECHO MEAS - ESV(MOD-SP2): 46.3 ML
BH CV ECHO MEAS - ESV(MOD-SP4): 40.9 ML
BH CV ECHO MEAS - ESV(TEICH): 50.5 ML
BH CV ECHO MEAS - FS: 20.3 %
BH CV ECHO MEAS - IVS/LVPW: 1.1
BH CV ECHO MEAS - IVSD: 1.2 CM
BH CV ECHO MEAS - LAT PEAK E' VEL: 5 CM/SEC
BH CV ECHO MEAS - LV DIASTOLIC VOL/BSA (35-75): 43.7 ML/M^2
BH CV ECHO MEAS - LV MASS(C)D: 187.7 GRAMS
BH CV ECHO MEAS - LV MASS(C)DI: 116.1 GRAMS/M^2
BH CV ECHO MEAS - LV MAX PG: 3.9 MMHG
BH CV ECHO MEAS - LV MEAN PG: 2 MMHG
BH CV ECHO MEAS - LV SYSTOLIC VOL/BSA (12-30): 25.3 ML/M^2
BH CV ECHO MEAS - LV V1 MAX: 99.2 CM/SEC
BH CV ECHO MEAS - LV V1 MEAN: 73.3 CM/SEC
BH CV ECHO MEAS - LV V1 VTI: 19.9 CM
BH CV ECHO MEAS - LVIDD: 4.4 CM
BH CV ECHO MEAS - LVIDS: 3.5 CM
BH CV ECHO MEAS - LVLD AP2: 6.6 CM
BH CV ECHO MEAS - LVLD AP4: 6.6 CM
BH CV ECHO MEAS - LVLS AP2: 5.6 CM
BH CV ECHO MEAS - LVLS AP4: 5.9 CM
BH CV ECHO MEAS - LVOT AREA (M): 3.1 CM^2
BH CV ECHO MEAS - LVOT AREA: 3.1 CM^2
BH CV ECHO MEAS - LVOT DIAM: 2 CM
BH CV ECHO MEAS - LVPWD: 1.2 CM
BH CV ECHO MEAS - MED PEAK E' VEL: 5 CM/SEC
BH CV ECHO MEAS - MV A DUR: 0.09 SEC
BH CV ECHO MEAS - MV A MAX VEL: 114 CM/SEC
BH CV ECHO MEAS - MV DEC SLOPE: 771 CM/SEC^2
BH CV ECHO MEAS - MV DEC TIME: 0.11 SEC
BH CV ECHO MEAS - MV E MAX VEL: 77.7 CM/SEC
BH CV ECHO MEAS - MV E/A: 0.68
BH CV ECHO MEAS - MV MAX PG: 9.1 MMHG
BH CV ECHO MEAS - MV MEAN PG: 4 MMHG
BH CV ECHO MEAS - MV P1/2T MAX VEL: 121 CM/SEC
BH CV ECHO MEAS - MV P1/2T: 46 MSEC
BH CV ECHO MEAS - MV V2 MAX: 151 CM/SEC
BH CV ECHO MEAS - MV V2 MEAN: 88.5 CM/SEC
BH CV ECHO MEAS - MV V2 VTI: 33 CM
BH CV ECHO MEAS - MVA P1/2T LCG: 1.8 CM^2
BH CV ECHO MEAS - MVA(P1/2T): 4.8 CM^2
BH CV ECHO MEAS - MVA(VTI): 1.9 CM^2
BH CV ECHO MEAS - PA ACC TIME: 0.07 SEC
BH CV ECHO MEAS - PA MAX PG (FULL): 0.76 MMHG
BH CV ECHO MEAS - PA MAX PG: 2.4 MMHG
BH CV ECHO MEAS - PA PR(ACCEL): 47.5 MMHG
BH CV ECHO MEAS - PA V2 MAX: 78.2 CM/SEC
BH CV ECHO MEAS - PULM A REVS DUR: 0.1 SEC
BH CV ECHO MEAS - PULM A REVS VEL: 45.4 CM/SEC
BH CV ECHO MEAS - PULM DIAS VEL: 38.3 CM/SEC
BH CV ECHO MEAS - PULM S/D: 1.3
BH CV ECHO MEAS - PULM SYS VEL: 49.9 CM/SEC
BH CV ECHO MEAS - PVA(V,A): 3.2 CM^2
BH CV ECHO MEAS - PVA(V,D): 3.2 CM^2
BH CV ECHO MEAS - QP/QS: 0.75
BH CV ECHO MEAS - RAP SYSTOLE: 15 MMHG
BH CV ECHO MEAS - RV MAX PG: 1.7 MMHG
BH CV ECHO MEAS - RV MEAN PG: 1 MMHG
BH CV ECHO MEAS - RV V1 MAX: 65 CM/SEC
BH CV ECHO MEAS - RV V1 MEAN: 51.3 CM/SEC
BH CV ECHO MEAS - RV V1 VTI: 12.4 CM
BH CV ECHO MEAS - RVOT AREA: 3.8 CM^2
BH CV ECHO MEAS - RVOT DIAM: 2.2 CM
BH CV ECHO MEAS - RVSP: 31 MMHG
BH CV ECHO MEAS - SI(AO): 119.3 ML/M^2
BH CV ECHO MEAS - SI(CUBED): 25.7 ML/M^2
BH CV ECHO MEAS - SI(LVOT): 38.7 ML/M^2
BH CV ECHO MEAS - SI(MOD-SP2): 22.1 ML/M^2
BH CV ECHO MEAS - SI(MOD-SP4): 18.4 ML/M^2
BH CV ECHO MEAS - SI(TEICH): 22.4 ML/M^2
BH CV ECHO MEAS - SV(AO): 193 ML
BH CV ECHO MEAS - SV(CUBED): 41.5 ML
BH CV ECHO MEAS - SV(LVOT): 62.5 ML
BH CV ECHO MEAS - SV(MOD-SP2): 35.8 ML
BH CV ECHO MEAS - SV(MOD-SP4): 29.8 ML
BH CV ECHO MEAS - SV(RVOT): 47.1 ML
BH CV ECHO MEAS - SV(TEICH): 36.2 ML
BH CV ECHO MEAS - TAPSE (>1.6): 1.7 CM2
BH CV ECHO MEAS - TR MAX VEL: 202 CM/SEC
BH CV ECHO MEASUREMENTS AVERAGE E/E' RATIO: 15.54
BH CV VAS BP RIGHT ARM: NORMAL MMHG
BH CV XLRA - RV BASE: 3.5 CM
BH CV XLRA - TDI S': 6 CM/SEC
BILIRUB SERPL-MCNC: 0.2 MG/DL (ref 0.2–1.2)
BILIRUB SERPL-MCNC: 0.5 MG/DL (ref 0.2–1.2)
BILIRUB UR QL STRIP: NEGATIVE
BODY TEMPERATURE: 37 C
BUN BLD-MCNC: 44 MG/DL (ref 8–23)
BUN BLD-MCNC: 52 MG/DL (ref 8–23)
BUN BLD-MCNC: 65 MG/DL (ref 8–23)
BUN BLD-MCNC: 75 MG/DL (ref 8–23)
BUN/CREAT SERPL: 24.4 (ref 7–25)
BUN/CREAT SERPL: 26.3 (ref 7–25)
BUN/CREAT SERPL: 30.2 (ref 7–25)
BUN/CREAT SERPL: 35.8 (ref 7–25)
C PNEUM DNA NPH QL NAA+NON-PROBE: NOT DETECTED
CALCIUM SPEC-SCNC: 7.8 MG/DL (ref 8.2–9.6)
CALCIUM SPEC-SCNC: 8.1 MG/DL (ref 8.2–9.6)
CALCIUM SPEC-SCNC: 8.1 MG/DL (ref 8.2–9.6)
CALCIUM SPEC-SCNC: 8.7 MG/DL (ref 8.2–9.6)
CHLORIDE SERPL-SCNC: 100 MMOL/L (ref 98–107)
CHLORIDE SERPL-SCNC: 101 MMOL/L (ref 98–107)
CHLORIDE SERPL-SCNC: 96 MMOL/L (ref 98–107)
CHLORIDE SERPL-SCNC: 96 MMOL/L (ref 98–107)
CHLORIDE UR-SCNC: <=20 MMOL/L
CK SERPL-CCNC: 59 U/L (ref 26–142)
CLARITY UR: CLEAR
CO2 SERPL-SCNC: 19 MMOL/L (ref 22–29)
CO2 SERPL-SCNC: 19.6 MMOL/L (ref 22–29)
CO2 SERPL-SCNC: 23.1 MMOL/L (ref 22–29)
CO2 SERPL-SCNC: 24.4 MMOL/L (ref 22–29)
COLOR UR: YELLOW
CREAT BLD-MCNC: 1.23 MG/DL (ref 0.57–1)
CREAT BLD-MCNC: 1.72 MG/DL (ref 0.57–1)
CREAT BLD-MCNC: 2.47 MG/DL (ref 0.57–1)
CREAT BLD-MCNC: 3.08 MG/DL (ref 0.57–1)
CREAT UR-MCNC: 78.6 MG/DL
D DIMER PPP FEU-MCNC: 1.97 MCGFEU/ML (ref 0–0.46)
D-LACTATE SERPL-SCNC: 0.7 MMOL/L (ref 0.5–2)
D-LACTATE SERPL-SCNC: 0.7 MMOL/L (ref 0.5–2)
D-LACTATE SERPL-SCNC: 1.1 MMOL/L (ref 0.5–2)
D-LACTATE SERPL-SCNC: 1.1 MMOL/L (ref 0.5–2)
D-LACTATE SERPL-SCNC: 1.3 MMOL/L (ref 0.5–2)
D-LACTATE SERPL-SCNC: 2.7 MMOL/L (ref 0.5–2)
DEPRECATED RDW RBC AUTO: 54.4 FL (ref 37–54)
DEPRECATED RDW RBC AUTO: 55.9 FL (ref 37–54)
DEPRECATED RDW RBC AUTO: 56.5 FL (ref 37–54)
DEPRECATED RDW RBC AUTO: 57.5 FL (ref 37–54)
EOSINOPHIL # BLD AUTO: 0 10*3/MM3 (ref 0.1–0.3)
EOSINOPHIL # BLD AUTO: 0 10*3/MM3 (ref 0.1–0.3)
EOSINOPHIL # BLD AUTO: 0.01 10*3/MM3 (ref 0.1–0.3)
EOSINOPHIL NFR BLD AUTO: 0 % (ref 0–4)
EOSINOPHIL NFR BLD AUTO: 0 % (ref 0–4)
EOSINOPHIL NFR BLD AUTO: 0.1 % (ref 0–4)
EOSINOPHIL SPEC QL MICRO: 0 % EOS/100 CELLS (ref 0–0)
ERYTHROCYTE [DISTWIDTH] IN BLOOD BY AUTOMATED COUNT: 16.8 % (ref 11.5–14.5)
ERYTHROCYTE [DISTWIDTH] IN BLOOD BY AUTOMATED COUNT: 17.2 % (ref 11.5–14.5)
ERYTHROCYTE [DISTWIDTH] IN BLOOD BY AUTOMATED COUNT: 17.2 % (ref 11.5–14.5)
ERYTHROCYTE [DISTWIDTH] IN BLOOD BY AUTOMATED COUNT: 17.8 % (ref 11.5–14.5)
FLUAV AG NPH QL: NEGATIVE
FLUAV H1 2009 PAND RNA NPH QL NAA+PROBE: NOT DETECTED
FLUAV H1 HA GENE NPH QL NAA+PROBE: NOT DETECTED
FLUAV H3 RNA NPH QL NAA+PROBE: NOT DETECTED
FLUAV SUBTYP SPEC NAA+PROBE: NOT DETECTED
FLUBV AG NPH QL IA: NEGATIVE
FLUBV RNA ISLT QL NAA+PROBE: NOT DETECTED
GAS FLOW AIRWAY: 4 LPM
GFR SERPL CREATININE-BSD FRML MDRD: 14 ML/MIN/1.73
GFR SERPL CREATININE-BSD FRML MDRD: 18 ML/MIN/1.73
GFR SERPL CREATININE-BSD FRML MDRD: 28 ML/MIN/1.73
GFR SERPL CREATININE-BSD FRML MDRD: 41 ML/MIN/1.73
GLOBULIN UR ELPH-MCNC: 3.1 GM/DL
GLOBULIN UR ELPH-MCNC: 3.1 GM/DL
GLUCOSE BLD-MCNC: 109 MG/DL (ref 65–99)
GLUCOSE BLD-MCNC: 142 MG/DL (ref 65–99)
GLUCOSE BLD-MCNC: 87 MG/DL (ref 65–99)
GLUCOSE BLD-MCNC: 93 MG/DL (ref 65–99)
GLUCOSE UR STRIP-MCNC: NEGATIVE MG/DL
GRAN CASTS URNS QL MICRO: ABNORMAL /LPF
HADV DNA SPEC NAA+PROBE: NOT DETECTED
HCO3 BLDA-SCNC: 22.6 MMOL/L (ref 20–26)
HCOV 229E RNA SPEC QL NAA+PROBE: NOT DETECTED
HCOV HKU1 RNA SPEC QL NAA+PROBE: NOT DETECTED
HCOV NL63 RNA SPEC QL NAA+PROBE: NOT DETECTED
HCOV OC43 RNA SPEC QL NAA+PROBE: NOT DETECTED
HCT VFR BLD AUTO: 27.7 % (ref 37–47)
HCT VFR BLD AUTO: 29.6 % (ref 37–47)
HCT VFR BLD AUTO: 29.8 % (ref 37–47)
HCT VFR BLD AUTO: 33.1 % (ref 37–47)
HGB BLD-MCNC: 10.7 G/DL (ref 12–16)
HGB BLD-MCNC: 8.8 G/DL (ref 12–16)
HGB BLD-MCNC: 9.4 G/DL (ref 12–16)
HGB BLD-MCNC: 9.6 G/DL (ref 12–16)
HGB BLDA-MCNC: 11.1 G/DL (ref 13.5–17.5)
HGB UR QL STRIP.AUTO: NEGATIVE
HMPV RNA NPH QL NAA+NON-PROBE: NOT DETECTED
HOLD SPECIMEN: NORMAL
HPIV1 RNA SPEC QL NAA+PROBE: NOT DETECTED
HPIV2 RNA SPEC QL NAA+PROBE: NOT DETECTED
HPIV3 RNA NPH QL NAA+PROBE: NOT DETECTED
HPIV4 P GENE NPH QL NAA+PROBE: NOT DETECTED
HYALINE CASTS UR QL AUTO: ABNORMAL /LPF
HYALINE CASTS UR QL AUTO: ABNORMAL /LPF
IMM GRANULOCYTES # BLD: 0.04 10*3/MM3 (ref 0–0.03)
IMM GRANULOCYTES # BLD: 0.61 10*3/MM3 (ref 0–0.03)
IMM GRANULOCYTES # BLD: 1.05 10*3/MM3 (ref 0–0.03)
IMM GRANULOCYTES NFR BLD: 0.6 % (ref 0–0.5)
IMM GRANULOCYTES NFR BLD: 8.5 % (ref 0–0.5)
IMM GRANULOCYTES NFR BLD: 9.7 % (ref 0–0.5)
KETONES UR QL STRIP: ABNORMAL
KETONES UR QL STRIP: NEGATIVE
KETONES UR QL STRIP: NEGATIVE
LEFT ATRIUM VOLUME INDEX: 39 ML/M2
LEUKOCYTE ESTERASE UR QL STRIP.AUTO: NEGATIVE
LV EF 2D ECHO EST: 44 %
LYMPHOCYTES # BLD AUTO: 0.27 10*3/MM3 (ref 0.6–4.8)
LYMPHOCYTES # BLD AUTO: 0.47 10*3/MM3 (ref 0.6–4.8)
LYMPHOCYTES # BLD AUTO: 0.73 10*3/MM3 (ref 0.6–4.8)
LYMPHOCYTES # BLD MANUAL: 0.4 10*3/MM3 (ref 0.6–4.8)
LYMPHOCYTES # BLD MANUAL: 0.81 10*3/MM3 (ref 0.6–4.8)
LYMPHOCYTES NFR BLD AUTO: 4.3 % (ref 20–45)
LYMPHOCYTES NFR BLD AUTO: 5.9 % (ref 20–45)
LYMPHOCYTES NFR BLD AUTO: 7 % (ref 20–45)
LYMPHOCYTES NFR BLD MANUAL: 10 % (ref 3–8)
LYMPHOCYTES NFR BLD MANUAL: 12 % (ref 20–45)
LYMPHOCYTES NFR BLD MANUAL: 6 % (ref 20–45)
LYMPHOCYTES NFR BLD MANUAL: 8 % (ref 3–8)
Lab: ABNORMAL
M PNEUMO IGG SER IA-ACNC: NOT DETECTED
MCH RBC QN AUTO: 28.1 PG (ref 27–31)
MCH RBC QN AUTO: 28.1 PG (ref 27–31)
MCH RBC QN AUTO: 28.5 PG (ref 27–31)
MCH RBC QN AUTO: 28.7 PG (ref 27–31)
MCHC RBC AUTO-ENTMCNC: 31.8 G/DL (ref 31–37)
MCHC RBC AUTO-ENTMCNC: 31.8 G/DL (ref 31–37)
MCHC RBC AUTO-ENTMCNC: 32.2 G/DL (ref 31–37)
MCHC RBC AUTO-ENTMCNC: 32.3 G/DL (ref 31–37)
MCV RBC AUTO: 88.3 FL (ref 81–99)
MCV RBC AUTO: 88.4 FL (ref 81–99)
MCV RBC AUTO: 88.5 FL (ref 81–99)
MCV RBC AUTO: 89 FL (ref 81–99)
METAMYELOCYTES NFR BLD MANUAL: 2 % (ref 0–0)
METAMYELOCYTES NFR BLD MANUAL: 5 % (ref 0–0)
MODALITY: ABNORMAL
MONOCYTES # BLD AUTO: 0.44 10*3/MM3 (ref 0–1)
MONOCYTES # BLD AUTO: 0.54 10*3/MM3 (ref 0–1)
MONOCYTES # BLD AUTO: 0.68 10*3/MM3 (ref 0–1)
MONOCYTES # BLD AUTO: 1.06 10*3/MM3 (ref 0–1)
MONOCYTES # BLD AUTO: 1.3 10*3/MM3 (ref 0–1)
MONOCYTES NFR BLD AUTO: 10.5 % (ref 3–8)
MONOCYTES NFR BLD AUTO: 15.8 % (ref 3–8)
MONOCYTES NFR BLD AUTO: 7 % (ref 3–8)
MRSA SPEC QL CULT: ABNORMAL
MUCOUS THREADS URNS QL MICRO: ABNORMAL /HPF
NEUTROPHILS # BLD AUTO: 4.95 10*3/MM3 (ref 1.5–8.3)
NEUTROPHILS # BLD AUTO: 4.98 10*3/MM3 (ref 1.5–8.3)
NEUTROPHILS # BLD AUTO: 5.12 10*3/MM3 (ref 1.5–8.3)
NEUTROPHILS # BLD AUTO: 5.64 10*3/MM3 (ref 1.5–8.3)
NEUTROPHILS # BLD AUTO: 9.24 10*3/MM3 (ref 1.5–8.3)
NEUTROPHILS NFR BLD AUTO: 74.8 % (ref 45–70)
NEUTROPHILS NFR BLD AUTO: 76.3 % (ref 45–70)
NEUTROPHILS NFR BLD AUTO: 78.7 % (ref 45–70)
NEUTROPHILS NFR BLD MANUAL: 61 % (ref 45–70)
NEUTROPHILS NFR BLD MANUAL: 68 % (ref 45–70)
NEUTS BAND NFR BLD MANUAL: 12 % (ref 0–5)
NEUTS BAND NFR BLD MANUAL: 16 % (ref 0–5)
NITRITE UR QL STRIP: NEGATIVE
NRBC BLD MANUAL-RTO: 0 /100 WBC (ref 0–0)
NT-PROBNP SERPL-MCNC: ABNORMAL PG/ML (ref 5–450)
OSMOLALITY UR: 354 MOSM/KG
PCO2 BLDA: 34.4 MM HG (ref 35–45)
PCO2 TEMP ADJ BLD: 34.4 MM HG (ref 35–45)
PH BLDA: 7.43 PH UNITS (ref 7.35–7.45)
PH UR STRIP.AUTO: 6 [PH] (ref 4.5–8)
PH UR STRIP.AUTO: <=5 [PH] (ref 4.5–8)
PH UR STRIP.AUTO: <=5 [PH] (ref 4.5–8)
PH, TEMP CORRECTED: 7.43 PH UNITS (ref 7.35–7.45)
PHOSPHATE SERPL-MCNC: 7.6 MG/DL (ref 2.7–4.5)
PLAT MORPH BLD: NORMAL
PLAT MORPH BLD: NORMAL
PLATELET # BLD AUTO: 252 10*3/MM3 (ref 140–500)
PLATELET # BLD AUTO: 267 10*3/MM3 (ref 140–500)
PLATELET # BLD AUTO: 279 10*3/MM3 (ref 140–500)
PLATELET # BLD AUTO: 316 10*3/MM3 (ref 140–500)
PMV BLD AUTO: 10.2 FL (ref 7.4–10.4)
PMV BLD AUTO: 10.6 FL (ref 7.4–10.4)
PMV BLD AUTO: 11.1 FL (ref 7.4–10.4)
PMV BLD AUTO: 11.2 FL (ref 7.4–10.4)
PO2 BLDA: 55.1 MM HG (ref 83–108)
PO2 TEMP ADJ BLD: 55.1 MM HG (ref 83–108)
POTASSIUM BLD-SCNC: 4.1 MMOL/L (ref 3.5–5.2)
POTASSIUM BLD-SCNC: 4.4 MMOL/L (ref 3.5–5.2)
POTASSIUM BLD-SCNC: 4.5 MMOL/L (ref 3.5–5.2)
POTASSIUM BLD-SCNC: 4.7 MMOL/L (ref 3.5–5.2)
PROCALCITONIN SERPL-MCNC: 15.91 NG/ML (ref 0.1–0.25)
PROCALCITONIN SERPL-MCNC: 18.23 NG/ML (ref 0.1–0.25)
PROT SERPL-MCNC: 5.8 G/DL (ref 6–8.5)
PROT SERPL-MCNC: 6.4 G/DL (ref 6–8.5)
PROT UR QL STRIP: ABNORMAL
PROT UR QL STRIP: ABNORMAL
PROT UR QL STRIP: NEGATIVE
PROT UR-MCNC: 66 MG/DL
RBC # BLD AUTO: 3.13 10*6/MM3 (ref 4.2–5.4)
RBC # BLD AUTO: 3.35 10*6/MM3 (ref 4.2–5.4)
RBC # BLD AUTO: 3.35 10*6/MM3 (ref 4.2–5.4)
RBC # BLD AUTO: 3.75 10*6/MM3 (ref 4.2–5.4)
RBC # UR: ABNORMAL /HPF
RBC # UR: ABNORMAL /HPF
RBC MORPH BLD: NORMAL
RBC MORPH BLD: NORMAL
REF LAB TEST METHOD: ABNORMAL
REF LAB TEST METHOD: ABNORMAL
RHINOVIRUS RNA SPEC NAA+PROBE: DETECTED
RSV RNA NPH QL NAA+NON-PROBE: NOT DETECTED
SAO2 % BLDCOA: 88.4 % (ref 94–99)
SCAN SLIDE: NORMAL
SODIUM BLD-SCNC: 134 MMOL/L (ref 136–145)
SODIUM BLD-SCNC: 136 MMOL/L (ref 136–145)
SODIUM BLD-SCNC: 137 MMOL/L (ref 136–145)
SODIUM BLD-SCNC: 140 MMOL/L (ref 136–145)
SODIUM UR-SCNC: <=20 MMOL/L
SP GR UR STRIP: 1.01 (ref 1–1.03)
SP GR UR STRIP: 1.01 (ref 1–1.03)
SP GR UR STRIP: 1.02 (ref 1–1.03)
SQUAMOUS #/AREA URNS HPF: ABNORMAL /HPF
SQUAMOUS #/AREA URNS HPF: ABNORMAL /HPF
TRANS CELLS #/AREA URNS HPF: ABNORMAL /HPF
TROPONIN T SERPL-MCNC: 0.94 NG/ML (ref 0–0.03)
TROPONIN T SERPL-MCNC: 1.73 NG/ML (ref 0–0.03)
TROPONIN T SERPL-MCNC: 1.85 NG/ML (ref 0–0.03)
TROPONIN T SERPL-MCNC: 2.27 NG/ML (ref 0–0.03)
TSH SERPL DL<=0.05 MIU/L-ACNC: 1.42 MIU/ML (ref 0.27–4.2)
URATE SERPL-MCNC: 9.9 MG/DL (ref 3.4–7)
UROBILINOGEN UR QL STRIP: ABNORMAL
UROBILINOGEN UR QL STRIP: ABNORMAL
UROBILINOGEN UR QL STRIP: NORMAL
VALPROATE SERPL-MCNC: 11.6 MCG/ML (ref 50–100)
VALPROATE SERPL-MCNC: 15.3 MCG/ML (ref 50–100)
VANCOMYCIN SERPL-MCNC: 14.3 MCG/ML (ref 5–40)
VENTILATOR MODE: ABNORMAL
WBC MORPH BLD: NORMAL
WBC MORPH BLD: NORMAL
WBC NRBC COR # BLD: 12.36 10*3/MM3 (ref 4.8–10.8)
WBC NRBC COR # BLD: 6.32 10*3/MM3 (ref 4.8–10.8)
WBC NRBC COR # BLD: 6.71 10*3/MM3 (ref 4.8–10.8)
WBC NRBC COR # BLD: 6.78 10*3/MM3 (ref 4.8–10.8)
WBC UR QL AUTO: ABNORMAL /HPF
WBC UR QL AUTO: ABNORMAL /HPF

## 2018-01-01 PROCEDURE — 80202 ASSAY OF VANCOMYCIN: CPT | Performed by: HOSPITALIST

## 2018-01-01 PROCEDURE — 82803 BLOOD GASES ANY COMBINATION: CPT

## 2018-01-01 PROCEDURE — 81003 URINALYSIS AUTO W/O SCOPE: CPT | Performed by: HOSPITALIST

## 2018-01-01 PROCEDURE — 99307 SBSQ NF CARE SF MDM 10: CPT | Performed by: NURSE PRACTITIONER

## 2018-01-01 PROCEDURE — 84156 ASSAY OF PROTEIN URINE: CPT | Performed by: INTERNAL MEDICINE

## 2018-01-01 PROCEDURE — 93306 TTE W/DOPPLER COMPLETE: CPT

## 2018-01-01 PROCEDURE — 87804 INFLUENZA ASSAY W/OPTIC: CPT | Performed by: EMERGENCY MEDICINE

## 2018-01-01 PROCEDURE — 85007 BL SMEAR W/DIFF WBC COUNT: CPT | Performed by: EMERGENCY MEDICINE

## 2018-01-01 PROCEDURE — 25010000002 ENOXAPARIN PER 10 MG: Performed by: INTERNAL MEDICINE

## 2018-01-01 PROCEDURE — 87633 RESP VIRUS 12-25 TARGETS: CPT | Performed by: EMERGENCY MEDICINE

## 2018-01-01 PROCEDURE — 94799 UNLISTED PULMONARY SVC/PX: CPT

## 2018-01-01 PROCEDURE — 80069 RENAL FUNCTION PANEL: CPT | Performed by: INTERNAL MEDICINE

## 2018-01-01 PROCEDURE — 84145 PROCALCITONIN (PCT): CPT | Performed by: NURSE PRACTITIONER

## 2018-01-01 PROCEDURE — 99291 CRITICAL CARE FIRST HOUR: CPT | Performed by: EMERGENCY MEDICINE

## 2018-01-01 PROCEDURE — 99308 SBSQ NF CARE LOW MDM 20: CPT | Performed by: NURSE PRACTITIONER

## 2018-01-01 PROCEDURE — 83605 ASSAY OF LACTIC ACID: CPT | Performed by: INTERNAL MEDICINE

## 2018-01-01 PROCEDURE — 25010000002 ONDANSETRON PER 1 MG: Performed by: HOSPITALIST

## 2018-01-01 PROCEDURE — 25010000002 MORPHINE PER 10 MG

## 2018-01-01 PROCEDURE — 87081 CULTURE SCREEN ONLY: CPT | Performed by: NURSE PRACTITIONER

## 2018-01-01 PROCEDURE — 99282 EMERGENCY DEPT VISIT SF MDM: CPT | Performed by: EMERGENCY MEDICINE

## 2018-01-01 PROCEDURE — 99232 SBSQ HOSP IP/OBS MODERATE 35: CPT | Performed by: INTERNAL MEDICINE

## 2018-01-01 PROCEDURE — 83880 ASSAY OF NATRIURETIC PEPTIDE: CPT | Performed by: EMERGENCY MEDICINE

## 2018-01-01 PROCEDURE — 25010000002 FUROSEMIDE PER 20 MG: Performed by: INTERNAL MEDICINE

## 2018-01-01 PROCEDURE — 92526 ORAL FUNCTION THERAPY: CPT

## 2018-01-01 PROCEDURE — 80053 COMPREHEN METABOLIC PANEL: CPT | Performed by: INTERNAL MEDICINE

## 2018-01-01 PROCEDURE — 83605 ASSAY OF LACTIC ACID: CPT | Performed by: HOSPITALIST

## 2018-01-01 PROCEDURE — 82550 ASSAY OF CK (CPK): CPT | Performed by: INTERNAL MEDICINE

## 2018-01-01 PROCEDURE — 92610 EVALUATE SWALLOWING FUNCTION: CPT

## 2018-01-01 PROCEDURE — 82570 ASSAY OF URINE CREATININE: CPT | Performed by: INTERNAL MEDICINE

## 2018-01-01 PROCEDURE — 99222 1ST HOSP IP/OBS MODERATE 55: CPT | Performed by: INTERNAL MEDICINE

## 2018-01-01 PROCEDURE — 99315 NF DSCHRG MGMT 30 MIN/LESS: CPT | Performed by: NURSE PRACTITIONER

## 2018-01-01 PROCEDURE — 81001 URINALYSIS AUTO W/SCOPE: CPT | Performed by: INTERNAL MEDICINE

## 2018-01-01 PROCEDURE — 84443 ASSAY THYROID STIM HORMONE: CPT | Performed by: EMERGENCY MEDICINE

## 2018-01-01 PROCEDURE — 93010 ELECTROCARDIOGRAM REPORT: CPT | Performed by: INTERNAL MEDICINE

## 2018-01-01 PROCEDURE — 25010000002 PIPERACILLIN-TAZOBACTAM: Performed by: HOSPITALIST

## 2018-01-01 PROCEDURE — 71275 CT ANGIOGRAPHY CHEST: CPT

## 2018-01-01 PROCEDURE — 99307 SBSQ NF CARE SF MDM 10: CPT | Performed by: HOSPITALIST

## 2018-01-01 PROCEDURE — 80048 BASIC METABOLIC PNL TOTAL CA: CPT | Performed by: HOSPITALIST

## 2018-01-01 PROCEDURE — 36600 WITHDRAWAL OF ARTERIAL BLOOD: CPT

## 2018-01-01 PROCEDURE — 99308 SBSQ NF CARE LOW MDM 20: CPT | Performed by: HOSPITALIST

## 2018-01-01 PROCEDURE — 85379 FIBRIN DEGRADATION QUANT: CPT | Performed by: EMERGENCY MEDICINE

## 2018-01-01 PROCEDURE — 80164 ASSAY DIPROPYLACETIC ACD TOT: CPT | Performed by: EMERGENCY MEDICINE

## 2018-01-01 PROCEDURE — 99233 SBSQ HOSP IP/OBS HIGH 50: CPT | Performed by: INTERNAL MEDICINE

## 2018-01-01 PROCEDURE — 85027 COMPLETE CBC AUTOMATED: CPT | Performed by: HOSPITALIST

## 2018-01-01 PROCEDURE — 93306 TTE W/DOPPLER COMPLETE: CPT | Performed by: INTERNAL MEDICINE

## 2018-01-01 PROCEDURE — 25010000002 MORPHINE SULFATE (PF) 2 MG/ML SOLUTION: Performed by: NURSE PRACTITIONER

## 2018-01-01 PROCEDURE — 76775 US EXAM ABDO BACK WALL LIM: CPT

## 2018-01-01 PROCEDURE — 93005 ELECTROCARDIOGRAM TRACING: CPT | Performed by: EMERGENCY MEDICINE

## 2018-01-01 PROCEDURE — 25010000002 PIPERACILLIN-TAZOBACTAM: Performed by: EMERGENCY MEDICINE

## 2018-01-01 PROCEDURE — 85025 COMPLETE CBC W/AUTO DIFF WBC: CPT | Performed by: NURSE PRACTITIONER

## 2018-01-01 PROCEDURE — 87798 DETECT AGENT NOS DNA AMP: CPT | Performed by: EMERGENCY MEDICINE

## 2018-01-01 PROCEDURE — 94762 N-INVAS EAR/PLS OXIMTRY CONT: CPT

## 2018-01-01 PROCEDURE — 99285 EMERGENCY DEPT VISIT HI MDM: CPT

## 2018-01-01 PROCEDURE — 99232 SBSQ HOSP IP/OBS MODERATE 35: CPT | Performed by: NURSE PRACTITIONER

## 2018-01-01 PROCEDURE — 87205 SMEAR GRAM STAIN: CPT | Performed by: INTERNAL MEDICINE

## 2018-01-01 PROCEDURE — 80164 ASSAY DIPROPYLACETIC ACD TOT: CPT | Performed by: HOSPITALIST

## 2018-01-01 PROCEDURE — 25010000002 ENOXAPARIN PER 10 MG

## 2018-01-01 PROCEDURE — 25010000002 ONDANSETRON PER 1 MG: Performed by: EMERGENCY MEDICINE

## 2018-01-01 PROCEDURE — 82436 ASSAY OF URINE CHLORIDE: CPT | Performed by: INTERNAL MEDICINE

## 2018-01-01 PROCEDURE — 25010000002 AZITHROMYCIN: Performed by: EMERGENCY MEDICINE

## 2018-01-01 PROCEDURE — 25010000002 PIPERACILLIN SOD-TAZOBACTAM PER 1 G: Performed by: HOSPITALIST

## 2018-01-01 PROCEDURE — 99283 EMERGENCY DEPT VISIT LOW MDM: CPT

## 2018-01-01 PROCEDURE — 25010000002 MORPHINE PER 10 MG: Performed by: NURSE PRACTITIONER

## 2018-01-01 PROCEDURE — 25010000002 VANCOMYCIN PER 500 MG

## 2018-01-01 PROCEDURE — 83935 ASSAY OF URINE OSMOLALITY: CPT | Performed by: NURSE PRACTITIONER

## 2018-01-01 PROCEDURE — 71045 X-RAY EXAM CHEST 1 VIEW: CPT

## 2018-01-01 PROCEDURE — 99239 HOSP IP/OBS DSCHRG MGMT >30: CPT | Performed by: NURSE PRACTITIONER

## 2018-01-01 PROCEDURE — 25010000002 LORAZEPAM PER 2 MG: Performed by: NURSE PRACTITIONER

## 2018-01-01 PROCEDURE — 84484 ASSAY OF TROPONIN QUANT: CPT | Performed by: NURSE PRACTITIONER

## 2018-01-01 PROCEDURE — 70450 CT HEAD/BRAIN W/O DYE: CPT

## 2018-01-01 PROCEDURE — 94640 AIRWAY INHALATION TREATMENT: CPT

## 2018-01-01 PROCEDURE — 25010000002 METHYLPREDNISOLONE PER 125 MG: Performed by: NURSE PRACTITIONER

## 2018-01-01 PROCEDURE — 80053 COMPREHEN METABOLIC PANEL: CPT | Performed by: EMERGENCY MEDICINE

## 2018-01-01 PROCEDURE — 0 IOPAMIDOL PER 1 ML: Performed by: EMERGENCY MEDICINE

## 2018-01-01 PROCEDURE — 87486 CHLMYD PNEUM DNA AMP PROBE: CPT | Performed by: EMERGENCY MEDICINE

## 2018-01-01 PROCEDURE — 83605 ASSAY OF LACTIC ACID: CPT | Performed by: EMERGENCY MEDICINE

## 2018-01-01 PROCEDURE — 84550 ASSAY OF BLOOD/URIC ACID: CPT | Performed by: INTERNAL MEDICINE

## 2018-01-01 PROCEDURE — 84484 ASSAY OF TROPONIN QUANT: CPT | Performed by: HOSPITALIST

## 2018-01-01 PROCEDURE — 85025 COMPLETE CBC W/AUTO DIFF WBC: CPT | Performed by: EMERGENCY MEDICINE

## 2018-01-01 PROCEDURE — 87581 M.PNEUMON DNA AMP PROBE: CPT | Performed by: EMERGENCY MEDICINE

## 2018-01-01 PROCEDURE — 85007 BL SMEAR W/DIFF WBC COUNT: CPT | Performed by: HOSPITALIST

## 2018-01-01 PROCEDURE — 87040 BLOOD CULTURE FOR BACTERIA: CPT | Performed by: EMERGENCY MEDICINE

## 2018-01-01 PROCEDURE — 81001 URINALYSIS AUTO W/SCOPE: CPT | Performed by: HOSPITALIST

## 2018-01-01 PROCEDURE — 84484 ASSAY OF TROPONIN QUANT: CPT | Performed by: EMERGENCY MEDICINE

## 2018-01-01 PROCEDURE — 84300 ASSAY OF URINE SODIUM: CPT | Performed by: INTERNAL MEDICINE

## 2018-01-01 RX ORDER — DIPHENHYDRAMINE HCL 25 MG
25 CAPSULE ORAL EVERY 6 HOURS PRN
Status: CANCELLED | OUTPATIENT
Start: 2018-01-01

## 2018-01-01 RX ORDER — DIVALPROEX SODIUM 125 MG/1
125 CAPSULE, COATED PELLETS ORAL EVERY 12 HOURS SCHEDULED
Status: DISCONTINUED | OUTPATIENT
Start: 2018-01-01 | End: 2018-01-01 | Stop reason: HOSPADM

## 2018-01-01 RX ORDER — CLONAZEPAM 0.5 MG/1
0.5 TABLET ORAL 2 TIMES DAILY
Status: CANCELLED | OUTPATIENT
Start: 2018-01-01

## 2018-01-01 RX ORDER — BUDESONIDE 0.5 MG/2ML
0.5 INHALANT ORAL
Status: DISCONTINUED | OUTPATIENT
Start: 2018-01-01 | End: 2018-01-01

## 2018-01-01 RX ORDER — DOXYCYCLINE HYCLATE 100 MG
100 TABLET ORAL EVERY 12 HOURS SCHEDULED
Status: DISCONTINUED | OUTPATIENT
Start: 2018-01-01 | End: 2018-01-01

## 2018-01-01 RX ORDER — IPRATROPIUM BROMIDE AND ALBUTEROL SULFATE 2.5; .5 MG/3ML; MG/3ML
3 SOLUTION RESPIRATORY (INHALATION) EVERY 4 HOURS PRN
Status: CANCELLED | OUTPATIENT
Start: 2018-01-01

## 2018-01-01 RX ORDER — ALBUTEROL SULFATE 2.5 MG/3ML
2.5 SOLUTION RESPIRATORY (INHALATION) ONCE
Status: COMPLETED | OUTPATIENT
Start: 2018-01-01 | End: 2018-01-01

## 2018-01-01 RX ORDER — LORAZEPAM 0.5 MG/1
0.5 TABLET ORAL
Status: DISCONTINUED | OUTPATIENT
Start: 2018-01-01 | End: 2018-01-01

## 2018-01-01 RX ORDER — DIVALPROEX SODIUM 125 MG/1
125 CAPSULE, COATED PELLETS ORAL 2 TIMES DAILY
COMMUNITY

## 2018-01-01 RX ORDER — FUROSEMIDE 10 MG/ML
40 INJECTION INTRAMUSCULAR; INTRAVENOUS ONCE
Status: COMPLETED | OUTPATIENT
Start: 2018-01-01 | End: 2018-01-01

## 2018-01-01 RX ORDER — LORAZEPAM 1 MG/1
1 TABLET ORAL
Status: DISCONTINUED | OUTPATIENT
Start: 2018-01-01 | End: 2018-01-01

## 2018-01-01 RX ORDER — LORAZEPAM 2 MG/ML
0.5 CONCENTRATE ORAL
Status: DISCONTINUED | OUTPATIENT
Start: 2018-01-01 | End: 2018-01-01

## 2018-01-01 RX ORDER — ONDANSETRON 2 MG/ML
4 INJECTION INTRAMUSCULAR; INTRAVENOUS EVERY 6 HOURS PRN
Status: CANCELLED | OUTPATIENT
Start: 2018-01-01

## 2018-01-01 RX ORDER — SODIUM CHLORIDE 9 MG/ML
40 INJECTION, SOLUTION INTRAVENOUS AS NEEDED
Status: DISCONTINUED | OUTPATIENT
Start: 2018-01-01 | End: 2018-01-01 | Stop reason: HOSPADM

## 2018-01-01 RX ORDER — MORPHINE SULFATE 2 MG/ML
2 INJECTION, SOLUTION INTRAMUSCULAR; INTRAVENOUS
Status: DISCONTINUED | OUTPATIENT
Start: 2018-01-01 | End: 2018-06-22 | Stop reason: HOSPADM

## 2018-01-01 RX ORDER — SODIUM CHLORIDE 9 MG/ML
40 INJECTION, SOLUTION INTRAVENOUS AS NEEDED
Status: CANCELLED | OUTPATIENT
Start: 2018-01-01

## 2018-01-01 RX ORDER — SODIUM CHLORIDE 9 MG/ML
40 INJECTION, SOLUTION INTRAVENOUS AS NEEDED
Status: DISCONTINUED | OUTPATIENT
Start: 2018-01-01 | End: 2018-06-22 | Stop reason: HOSPADM

## 2018-01-01 RX ORDER — BUSPIRONE HYDROCHLORIDE 15 MG/1
15 TABLET ORAL EVERY 8 HOURS SCHEDULED
Status: DISCONTINUED | OUTPATIENT
Start: 2018-01-01 | End: 2018-01-01

## 2018-01-01 RX ORDER — ISOSORBIDE DINITRATE 10 MG/1
10 TABLET ORAL
Status: CANCELLED | OUTPATIENT
Start: 2018-01-01

## 2018-01-01 RX ORDER — DONEPEZIL HYDROCHLORIDE 5 MG/1
10 TABLET, FILM COATED ORAL NIGHTLY
Status: CANCELLED | OUTPATIENT
Start: 2018-01-01

## 2018-01-01 RX ORDER — SODIUM CHLORIDE 9 MG/ML
100 INJECTION, SOLUTION INTRAVENOUS CONTINUOUS
Status: DISCONTINUED | OUTPATIENT
Start: 2018-01-01 | End: 2018-01-01

## 2018-01-01 RX ORDER — MECLIZINE HCL 12.5 MG/1
12.5 TABLET ORAL EVERY 8 HOURS SCHEDULED
Status: DISCONTINUED | OUTPATIENT
Start: 2018-01-01 | End: 2018-01-01 | Stop reason: HOSPADM

## 2018-01-01 RX ORDER — DOXYCYCLINE HYCLATE 50 MG/1
324 CAPSULE, GELATIN COATED ORAL
COMMUNITY

## 2018-01-01 RX ORDER — MORPHINE SULFATE 20 MG/ML
10 SOLUTION ORAL
Status: CANCELLED | OUTPATIENT
Start: 2018-01-01 | End: 2018-06-30

## 2018-01-01 RX ORDER — DIPHENHYDRAMINE HYDROCHLORIDE 50 MG/ML
25 INJECTION INTRAMUSCULAR; INTRAVENOUS EVERY 6 HOURS PRN
Status: CANCELLED | OUTPATIENT
Start: 2018-01-01

## 2018-01-01 RX ORDER — ACETAMINOPHEN 500 MG
1000 TABLET ORAL EVERY 8 HOURS
Status: DISCONTINUED | OUTPATIENT
Start: 2018-01-01 | End: 2018-01-01

## 2018-01-01 RX ORDER — BUSPIRONE HYDROCHLORIDE 15 MG/1
15 TABLET ORAL EVERY 8 HOURS SCHEDULED
Status: DISCONTINUED | OUTPATIENT
Start: 2018-01-01 | End: 2018-01-01 | Stop reason: HOSPADM

## 2018-01-01 RX ORDER — ONDANSETRON 4 MG/1
4 TABLET, FILM COATED ORAL EVERY 6 HOURS PRN
Status: DISCONTINUED | OUTPATIENT
Start: 2018-01-01 | End: 2018-01-01

## 2018-01-01 RX ORDER — L.ACID,PARA/B.BIFIDUM/S.THERM 8B CELL
1 CAPSULE ORAL DAILY
Status: DISCONTINUED | OUTPATIENT
Start: 2018-01-01 | End: 2018-01-01 | Stop reason: HOSPADM

## 2018-01-01 RX ORDER — IPRATROPIUM BROMIDE AND ALBUTEROL SULFATE 2.5; .5 MG/3ML; MG/3ML
3 SOLUTION RESPIRATORY (INHALATION)
Status: CANCELLED | OUTPATIENT
Start: 2018-01-01

## 2018-01-01 RX ORDER — ACETAMINOPHEN 325 MG/1
650 TABLET ORAL EVERY 4 HOURS PRN
Status: CANCELLED | OUTPATIENT
Start: 2018-01-01

## 2018-01-01 RX ORDER — PROMETHAZINE HYDROCHLORIDE 12.5 MG/1
6.25 SUPPOSITORY RECTAL EVERY 4 HOURS PRN
Status: CANCELLED | OUTPATIENT
Start: 2018-01-01

## 2018-01-01 RX ORDER — LORAZEPAM 2 MG/ML
0.5 CONCENTRATE ORAL
Status: CANCELLED | OUTPATIENT
Start: 2018-01-01 | End: 2018-06-30

## 2018-01-01 RX ORDER — LACTULOSE 10 G/15ML
10 SOLUTION ORAL DAILY
COMMUNITY

## 2018-01-01 RX ORDER — LEVOTHYROXINE SODIUM 88 UG/1
88 TABLET ORAL DAILY
Status: DISCONTINUED | OUTPATIENT
Start: 2018-01-01 | End: 2018-01-01 | Stop reason: HOSPADM

## 2018-01-01 RX ORDER — ONDANSETRON 4 MG/1
4 TABLET, ORALLY DISINTEGRATING ORAL EVERY 6 HOURS PRN
Status: DISCONTINUED | OUTPATIENT
Start: 2018-01-01 | End: 2018-01-01

## 2018-01-01 RX ORDER — ALBUTEROL SULFATE 2.5 MG/3ML
2.5 SOLUTION RESPIRATORY (INHALATION)
Status: DISCONTINUED | OUTPATIENT
Start: 2018-01-01 | End: 2018-01-01

## 2018-01-01 RX ORDER — ISOSORBIDE DINITRATE 10 MG/1
10 TABLET ORAL
Status: DISCONTINUED | OUTPATIENT
Start: 2018-01-01 | End: 2018-01-01

## 2018-01-01 RX ORDER — ACETAMINOPHEN 500 MG
1000 TABLET ORAL EVERY 8 HOURS
Status: DISCONTINUED | OUTPATIENT
Start: 2018-01-01 | End: 2018-01-01 | Stop reason: HOSPADM

## 2018-01-01 RX ORDER — LORAZEPAM 1 MG/1
2 TABLET ORAL
Status: CANCELLED | OUTPATIENT
Start: 2018-01-01 | End: 2018-06-30

## 2018-01-01 RX ORDER — ONDANSETRON 4 MG/1
4 TABLET, FILM COATED ORAL EVERY 6 HOURS PRN
Status: DISCONTINUED | OUTPATIENT
Start: 2018-01-01 | End: 2018-01-01 | Stop reason: HOSPADM

## 2018-01-01 RX ORDER — ACETAMINOPHEN 650 MG/1
650 SUPPOSITORY RECTAL EVERY 4 HOURS PRN
Status: CANCELLED | OUTPATIENT
Start: 2018-01-01

## 2018-01-01 RX ORDER — ACETAMINOPHEN 500 MG
1000 TABLET ORAL EVERY 8 HOURS
Status: CANCELLED | OUTPATIENT
Start: 2018-01-01

## 2018-01-01 RX ORDER — BUSPIRONE HYDROCHLORIDE 15 MG/1
15 TABLET ORAL EVERY 8 HOURS SCHEDULED
Status: CANCELLED | OUTPATIENT
Start: 2018-01-01

## 2018-01-01 RX ORDER — PROMETHAZINE HYDROCHLORIDE 6.25 MG/5ML
6.25 SYRUP ORAL EVERY 4 HOURS PRN
Status: CANCELLED | OUTPATIENT
Start: 2018-01-01

## 2018-01-01 RX ORDER — CLONAZEPAM 0.5 MG/1
0.5 TABLET ORAL 2 TIMES DAILY
Status: DISCONTINUED | OUTPATIENT
Start: 2018-01-01 | End: 2018-01-01 | Stop reason: HOSPADM

## 2018-01-01 RX ORDER — POLYETHYLENE GLYCOL 3350 17 G/17G
17 POWDER, FOR SOLUTION ORAL DAILY
Status: DISCONTINUED | OUTPATIENT
Start: 2018-01-01 | End: 2018-01-01 | Stop reason: HOSPADM

## 2018-01-01 RX ORDER — ACETAMINOPHEN 650 MG/1
650 SUPPOSITORY RECTAL ONCE
Status: COMPLETED | OUTPATIENT
Start: 2018-01-01 | End: 2018-01-01

## 2018-01-01 RX ORDER — DOCUSATE SODIUM 100 MG/1
100 CAPSULE, LIQUID FILLED ORAL 2 TIMES DAILY
Status: CANCELLED | OUTPATIENT
Start: 2018-01-01

## 2018-01-01 RX ORDER — SODIUM CHLORIDE 0.9 % (FLUSH) 0.9 %
1-10 SYRINGE (ML) INJECTION AS NEEDED
Status: DISCONTINUED | OUTPATIENT
Start: 2018-01-01 | End: 2018-01-01 | Stop reason: HOSPADM

## 2018-01-01 RX ORDER — ONDANSETRON 4 MG/1
4 TABLET, FILM COATED ORAL EVERY 6 HOURS PRN
Status: CANCELLED | OUTPATIENT
Start: 2018-01-01

## 2018-01-01 RX ORDER — DONEPEZIL HYDROCHLORIDE 10 MG/1
10 TABLET, FILM COATED ORAL NIGHTLY
COMMUNITY

## 2018-01-01 RX ORDER — IPRATROPIUM BROMIDE AND ALBUTEROL SULFATE 2.5; .5 MG/3ML; MG/3ML
3 SOLUTION RESPIRATORY (INHALATION) EVERY 4 HOURS PRN
Status: DISCONTINUED | OUTPATIENT
Start: 2018-01-01 | End: 2018-06-22 | Stop reason: HOSPADM

## 2018-01-01 RX ORDER — MORPHINE SULFATE 2 MG/ML
4 INJECTION, SOLUTION INTRAMUSCULAR; INTRAVENOUS
Status: DISCONTINUED | OUTPATIENT
Start: 2018-01-01 | End: 2018-01-01

## 2018-01-01 RX ORDER — DIPHENHYDRAMINE HCL 25 MG
25 CAPSULE ORAL EVERY 6 HOURS PRN
Status: DISCONTINUED | OUTPATIENT
Start: 2018-01-01 | End: 2018-01-01

## 2018-01-01 RX ORDER — POLYVINYL ALCOHOL 14 MG/ML
1 SOLUTION/ DROPS OPHTHALMIC
Status: DISCONTINUED | OUTPATIENT
Start: 2018-01-01 | End: 2018-06-22 | Stop reason: HOSPADM

## 2018-01-01 RX ORDER — LEVOTHYROXINE SODIUM ANHYDROUS 100 UG/5ML
50 INJECTION, POWDER, LYOPHILIZED, FOR SOLUTION INTRAVENOUS
Status: DISCONTINUED | OUTPATIENT
Start: 2018-01-01 | End: 2018-01-01

## 2018-01-01 RX ORDER — PROMETHAZINE HYDROCHLORIDE 25 MG/ML
6.25 INJECTION, SOLUTION INTRAMUSCULAR; INTRAVENOUS EVERY 4 HOURS PRN
Status: CANCELLED | OUTPATIENT
Start: 2018-01-01

## 2018-01-01 RX ORDER — GUAIFENESIN 600 MG/1
1200 TABLET, EXTENDED RELEASE ORAL 2 TIMES DAILY
COMMUNITY

## 2018-01-01 RX ORDER — PANTOPRAZOLE SODIUM 40 MG/1
40 TABLET, DELAYED RELEASE ORAL DAILY
Status: CANCELLED | OUTPATIENT
Start: 2018-01-01

## 2018-01-01 RX ORDER — BUSPIRONE HYDROCHLORIDE 5 MG/1
10 TABLET ORAL EVERY 12 HOURS SCHEDULED
Status: DISCONTINUED | OUTPATIENT
Start: 2018-01-01 | End: 2018-01-01

## 2018-01-01 RX ORDER — LORAZEPAM 0.5 MG/1
0.5 TABLET ORAL
Status: CANCELLED | OUTPATIENT
Start: 2018-01-01 | End: 2018-06-30

## 2018-01-01 RX ORDER — ONDANSETRON 2 MG/ML
4 INJECTION INTRAMUSCULAR; INTRAVENOUS EVERY 6 HOURS PRN
Status: DISCONTINUED | OUTPATIENT
Start: 2018-01-01 | End: 2018-06-22 | Stop reason: HOSPADM

## 2018-01-01 RX ORDER — SODIUM CHLORIDE 9 MG/ML
INJECTION, SOLUTION INTRAVENOUS
Status: DISPENSED
Start: 2018-01-01 | End: 2018-01-01

## 2018-01-01 RX ORDER — VANCOMYCIN HYDROCHLORIDE
20 ONCE
Status: DISCONTINUED | OUTPATIENT
Start: 2018-01-01 | End: 2018-01-01

## 2018-01-01 RX ORDER — MORPHINE SULFATE 2 MG/ML
2 INJECTION, SOLUTION INTRAMUSCULAR; INTRAVENOUS ONCE
Status: DISCONTINUED | OUTPATIENT
Start: 2018-01-01 | End: 2018-01-01 | Stop reason: HOSPADM

## 2018-01-01 RX ORDER — ASPIRIN 81 MG/1
324 TABLET, CHEWABLE ORAL ONCE
Status: DISCONTINUED | OUTPATIENT
Start: 2018-01-01 | End: 2018-01-01

## 2018-01-01 RX ORDER — ONDANSETRON 2 MG/ML
4 INJECTION INTRAMUSCULAR; INTRAVENOUS ONCE
Status: COMPLETED | OUTPATIENT
Start: 2018-01-01 | End: 2018-01-01

## 2018-01-01 RX ORDER — SCOLOPAMINE TRANSDERMAL SYSTEM 1 MG/1
1 PATCH, EXTENDED RELEASE TRANSDERMAL
Status: CANCELLED | OUTPATIENT
Start: 2018-01-01

## 2018-01-01 RX ORDER — ONDANSETRON 2 MG/ML
4 INJECTION INTRAMUSCULAR; INTRAVENOUS EVERY 6 HOURS PRN
Status: DISCONTINUED | OUTPATIENT
Start: 2018-01-01 | End: 2018-01-01 | Stop reason: HOSPADM

## 2018-01-01 RX ORDER — IPRATROPIUM BROMIDE AND ALBUTEROL SULFATE 2.5; .5 MG/3ML; MG/3ML
3 SOLUTION RESPIRATORY (INHALATION)
Status: DISCONTINUED | OUTPATIENT
Start: 2018-01-01 | End: 2018-06-22 | Stop reason: HOSPADM

## 2018-01-01 RX ORDER — ISOSORBIDE DINITRATE 10 MG/1
10 TABLET ORAL
Status: DISCONTINUED | OUTPATIENT
Start: 2018-01-01 | End: 2018-01-01 | Stop reason: HOSPADM

## 2018-01-01 RX ORDER — ASPIRIN 81 MG/1
81 TABLET, CHEWABLE ORAL DAILY
Status: DISCONTINUED | OUTPATIENT
Start: 2018-01-01 | End: 2018-01-01 | Stop reason: HOSPADM

## 2018-01-01 RX ORDER — DOXYCYCLINE HYCLATE 100 MG
100 TABLET ORAL EVERY 12 HOURS SCHEDULED
Status: CANCELLED | OUTPATIENT
Start: 2018-01-01 | End: 2018-06-30

## 2018-01-01 RX ORDER — PROMETHAZINE HYDROCHLORIDE 6.25 MG/5ML
6.25 SYRUP ORAL EVERY 4 HOURS PRN
Status: DISCONTINUED | OUTPATIENT
Start: 2018-01-01 | End: 2018-01-01

## 2018-01-01 RX ORDER — IPRATROPIUM BROMIDE AND ALBUTEROL SULFATE 2.5; .5 MG/3ML; MG/3ML
3 SOLUTION RESPIRATORY (INHALATION)
Status: DISCONTINUED | OUTPATIENT
Start: 2018-01-01 | End: 2018-01-01 | Stop reason: HOSPADM

## 2018-01-01 RX ORDER — METHYLPREDNISOLONE SODIUM SUCCINATE 125 MG/2ML
125 INJECTION, POWDER, LYOPHILIZED, FOR SOLUTION INTRAMUSCULAR; INTRAVENOUS ONCE
Status: COMPLETED | OUTPATIENT
Start: 2018-01-01 | End: 2018-01-01

## 2018-01-01 RX ORDER — CLONAZEPAM 0.5 MG/1
0.5 TABLET ORAL 2 TIMES DAILY
COMMUNITY

## 2018-01-01 RX ORDER — POLYETHYLENE GLYCOL 3350 17 G/17G
17 POWDER, FOR SOLUTION ORAL DAILY
Status: CANCELLED | OUTPATIENT
Start: 2018-01-01

## 2018-01-01 RX ORDER — LORAZEPAM 2 MG/ML
2 CONCENTRATE ORAL
Status: CANCELLED | OUTPATIENT
Start: 2018-01-01 | End: 2018-06-30

## 2018-01-01 RX ORDER — LORAZEPAM 2 MG/ML
2 CONCENTRATE ORAL
Status: DISCONTINUED | OUTPATIENT
Start: 2018-01-01 | End: 2018-01-01

## 2018-01-01 RX ORDER — DOXYCYCLINE HYCLATE 100 MG
100 TABLET ORAL EVERY 12 HOURS SCHEDULED
Status: DISCONTINUED | OUTPATIENT
Start: 2018-01-01 | End: 2018-01-01 | Stop reason: HOSPADM

## 2018-01-01 RX ORDER — LORAZEPAM 1 MG/1
2 TABLET ORAL
Status: DISCONTINUED | OUTPATIENT
Start: 2018-01-01 | End: 2018-01-01

## 2018-01-01 RX ORDER — LORAZEPAM 2 MG/ML
1 CONCENTRATE ORAL
Status: CANCELLED | OUTPATIENT
Start: 2018-01-01 | End: 2018-06-30

## 2018-01-01 RX ORDER — DIPHENHYDRAMINE HYDROCHLORIDE 50 MG/ML
25 INJECTION INTRAMUSCULAR; INTRAVENOUS EVERY 6 HOURS PRN
Status: DISCONTINUED | OUTPATIENT
Start: 2018-01-01 | End: 2018-06-22 | Stop reason: HOSPADM

## 2018-01-01 RX ORDER — IPRATROPIUM BROMIDE AND ALBUTEROL SULFATE 2.5; .5 MG/3ML; MG/3ML
3 SOLUTION RESPIRATORY (INHALATION) ONCE
Status: COMPLETED | OUTPATIENT
Start: 2018-01-01 | End: 2018-01-01

## 2018-01-01 RX ORDER — IPRATROPIUM BROMIDE AND ALBUTEROL SULFATE 2.5; .5 MG/3ML; MG/3ML
3 SOLUTION RESPIRATORY (INHALATION)
Status: DISCONTINUED | OUTPATIENT
Start: 2018-01-01 | End: 2018-01-01

## 2018-01-01 RX ORDER — DIPHENOXYLATE HYDROCHLORIDE AND ATROPINE SULFATE 2.5; .025 MG/1; MG/1
1 TABLET ORAL
Status: DISCONTINUED | OUTPATIENT
Start: 2018-01-01 | End: 2018-01-01

## 2018-01-01 RX ORDER — SODIUM CHLORIDE 9 MG/ML
INJECTION, SOLUTION INTRAVENOUS
Status: COMPLETED
Start: 2018-01-01 | End: 2018-01-01

## 2018-01-01 RX ORDER — DOCUSATE SODIUM 100 MG/1
100 CAPSULE, LIQUID FILLED ORAL 2 TIMES DAILY
Status: DISCONTINUED | OUTPATIENT
Start: 2018-01-01 | End: 2018-01-01 | Stop reason: HOSPADM

## 2018-01-01 RX ORDER — ONDANSETRON 4 MG/1
4 TABLET, ORALLY DISINTEGRATING ORAL EVERY 6 HOURS PRN
Status: DISCONTINUED | OUTPATIENT
Start: 2018-01-01 | End: 2018-01-01 | Stop reason: HOSPADM

## 2018-01-01 RX ORDER — MECLIZINE HCL 12.5 MG/1
12.5 TABLET ORAL EVERY 8 HOURS SCHEDULED
Status: DISCONTINUED | OUTPATIENT
Start: 2018-01-01 | End: 2018-01-01

## 2018-01-01 RX ORDER — SODIUM CHLORIDE 0.9 % (FLUSH) 0.9 %
1-10 SYRINGE (ML) INJECTION AS NEEDED
Status: CANCELLED | OUTPATIENT
Start: 2018-01-01

## 2018-01-01 RX ORDER — IPRATROPIUM BROMIDE AND ALBUTEROL SULFATE 2.5; .5 MG/3ML; MG/3ML
3 SOLUTION RESPIRATORY (INHALATION) EVERY 4 HOURS PRN
Status: DISCONTINUED | OUTPATIENT
Start: 2018-01-01 | End: 2018-01-01 | Stop reason: HOSPADM

## 2018-01-01 RX ORDER — DIVALPROEX SODIUM 125 MG/1
CAPSULE, COATED PELLETS ORAL
Status: DISCONTINUED
Start: 2018-01-01 | End: 2018-01-01 | Stop reason: WASHOUT

## 2018-01-01 RX ORDER — LORAZEPAM 1 MG/1
1 TABLET ORAL
Status: CANCELLED | OUTPATIENT
Start: 2018-01-01 | End: 2018-06-30

## 2018-01-01 RX ORDER — SODIUM CHLORIDE 0.9 % (FLUSH) 0.9 %
1-10 SYRINGE (ML) INJECTION AS NEEDED
Status: DISCONTINUED | OUTPATIENT
Start: 2018-01-01 | End: 2018-06-22 | Stop reason: HOSPADM

## 2018-01-01 RX ORDER — CLONAZEPAM 0.5 MG/1
0.5 TABLET ORAL 2 TIMES DAILY
Status: DISCONTINUED | OUTPATIENT
Start: 2018-01-01 | End: 2018-01-01

## 2018-01-01 RX ORDER — GLYCOPYRROLATE 0.2 MG/ML
0.1 INJECTION INTRAMUSCULAR; INTRAVENOUS EVERY 4 HOURS PRN
Status: DISCONTINUED | OUTPATIENT
Start: 2018-01-01 | End: 2018-06-22 | Stop reason: HOSPADM

## 2018-01-01 RX ORDER — DIVALPROEX SODIUM 125 MG/1
125 CAPSULE, COATED PELLETS ORAL EVERY 12 HOURS SCHEDULED
Status: DISCONTINUED | OUTPATIENT
Start: 2018-01-01 | End: 2018-01-01 | Stop reason: SDUPTHER

## 2018-01-01 RX ORDER — VALPROATE SODIUM 100 MG/ML
INJECTION, SOLUTION INTRAVENOUS
Status: DISPENSED
Start: 2018-01-01 | End: 2018-01-01

## 2018-01-01 RX ORDER — LORAZEPAM 2 MG/ML
1 CONCENTRATE ORAL
Status: DISCONTINUED | OUTPATIENT
Start: 2018-01-01 | End: 2018-01-01

## 2018-01-01 RX ORDER — ACETAMINOPHEN 650 MG/1
650 SUPPOSITORY RECTAL EVERY 4 HOURS PRN
Status: DISCONTINUED | OUTPATIENT
Start: 2018-01-01 | End: 2018-06-22 | Stop reason: HOSPADM

## 2018-01-01 RX ORDER — ASPIRIN 300 MG/1
300 SUPPOSITORY RECTAL ONCE
Status: COMPLETED | OUTPATIENT
Start: 2018-01-01 | End: 2018-01-01

## 2018-01-01 RX ORDER — DIPHENOXYLATE HYDROCHLORIDE AND ATROPINE SULFATE 2.5; .025 MG/1; MG/1
1 TABLET ORAL
Status: CANCELLED | OUTPATIENT
Start: 2018-01-01

## 2018-01-01 RX ORDER — MORPHINE SULFATE 20 MG/ML
10 SOLUTION ORAL
Status: DISCONTINUED | OUTPATIENT
Start: 2018-01-01 | End: 2018-01-01

## 2018-01-01 RX ORDER — PROMETHAZINE HYDROCHLORIDE 12.5 MG/1
6.25 SUPPOSITORY RECTAL EVERY 4 HOURS PRN
Status: DISCONTINUED | OUTPATIENT
Start: 2018-01-01 | End: 2018-06-22 | Stop reason: HOSPADM

## 2018-01-01 RX ORDER — PROMETHAZINE HYDROCHLORIDE 25 MG/ML
6.25 INJECTION, SOLUTION INTRAMUSCULAR; INTRAVENOUS EVERY 4 HOURS PRN
Status: DISCONTINUED | OUTPATIENT
Start: 2018-01-01 | End: 2018-06-22 | Stop reason: HOSPADM

## 2018-01-01 RX ORDER — SODIUM PHOSPHATE, DIBASIC AND SODIUM PHOSPHATE, MONOBASIC 7; 19 G/133ML; G/133ML
1 ENEMA RECTAL DAILY PRN
COMMUNITY

## 2018-01-01 RX ORDER — DIVALPROEX SODIUM 125 MG/1
125 CAPSULE, COATED PELLETS ORAL EVERY 12 HOURS SCHEDULED
Status: CANCELLED | OUTPATIENT
Start: 2018-01-01

## 2018-01-01 RX ORDER — PANTOPRAZOLE SODIUM 40 MG/1
40 TABLET, DELAYED RELEASE ORAL DAILY
Status: DISCONTINUED | OUTPATIENT
Start: 2018-01-01 | End: 2018-01-01

## 2018-01-01 RX ORDER — GUAIFENESIN 600 MG/1
1200 TABLET, EXTENDED RELEASE ORAL EVERY 12 HOURS SCHEDULED
Status: DISCONTINUED | OUTPATIENT
Start: 2018-01-01 | End: 2018-01-01 | Stop reason: HOSPADM

## 2018-01-01 RX ORDER — ACETAMINOPHEN 325 MG/1
650 TABLET ORAL EVERY 4 HOURS PRN
Status: DISCONTINUED | OUTPATIENT
Start: 2018-01-01 | End: 2018-01-01

## 2018-01-01 RX ORDER — PANTOPRAZOLE SODIUM 40 MG/1
40 TABLET, DELAYED RELEASE ORAL DAILY
Status: DISCONTINUED | OUTPATIENT
Start: 2018-01-01 | End: 2018-01-01 | Stop reason: HOSPADM

## 2018-01-01 RX ORDER — SODIUM PHOSPHATE, DIBASIC AND SODIUM PHOSPHATE, MONOBASIC 7; 19 G/133ML; G/133ML
1 ENEMA RECTAL DAILY PRN
Status: DISCONTINUED | OUTPATIENT
Start: 2018-01-01 | End: 2018-01-01

## 2018-01-01 RX ORDER — ONDANSETRON 4 MG/1
4 TABLET, FILM COATED ORAL EVERY 6 HOURS PRN
COMMUNITY

## 2018-01-01 RX ORDER — PROMETHAZINE HYDROCHLORIDE 12.5 MG/1
6.25 TABLET ORAL EVERY 4 HOURS PRN
Status: CANCELLED | OUTPATIENT
Start: 2018-01-01

## 2018-01-01 RX ORDER — VANCOMYCIN HYDROCHLORIDE 500 MG/10ML
INJECTION, POWDER, LYOPHILIZED, FOR SOLUTION INTRAVENOUS
Status: COMPLETED
Start: 2018-01-01 | End: 2018-01-01

## 2018-01-01 RX ORDER — PROMETHAZINE HYDROCHLORIDE 25 MG/1
6.25 TABLET ORAL EVERY 4 HOURS PRN
Status: DISCONTINUED | OUTPATIENT
Start: 2018-01-01 | End: 2018-01-01

## 2018-01-01 RX ORDER — DONEPEZIL HYDROCHLORIDE 5 MG/1
10 TABLET, FILM COATED ORAL NIGHTLY
Status: DISCONTINUED | OUTPATIENT
Start: 2018-01-01 | End: 2018-01-01 | Stop reason: HOSPADM

## 2018-01-01 RX ORDER — LORAZEPAM 2 MG/ML
1 INJECTION INTRAMUSCULAR
Status: DISCONTINUED | OUTPATIENT
Start: 2018-01-01 | End: 2018-06-22 | Stop reason: HOSPADM

## 2018-01-01 RX ORDER — MECLIZINE HCL 12.5 MG/1
12.5 TABLET ORAL EVERY 8 HOURS SCHEDULED
Status: CANCELLED | OUTPATIENT
Start: 2018-01-01

## 2018-01-01 RX ORDER — BUSPIRONE HYDROCHLORIDE 15 MG/1
15 TABLET ORAL 3 TIMES DAILY
COMMUNITY

## 2018-01-01 RX ORDER — SCOLOPAMINE TRANSDERMAL SYSTEM 1 MG/1
1 PATCH, EXTENDED RELEASE TRANSDERMAL
Status: DISCONTINUED | OUTPATIENT
Start: 2018-01-01 | End: 2018-01-01

## 2018-01-01 RX ORDER — ACETAMINOPHEN 160 MG/5ML
650 SOLUTION ORAL EVERY 4 HOURS PRN
Status: DISCONTINUED | OUTPATIENT
Start: 2018-01-01 | End: 2018-01-01

## 2018-01-01 RX ORDER — ONDANSETRON 4 MG/1
4 TABLET, ORALLY DISINTEGRATING ORAL EVERY 6 HOURS PRN
Status: CANCELLED | OUTPATIENT
Start: 2018-01-01

## 2018-01-01 RX ORDER — ACETAMINOPHEN 160 MG/5ML
650 SOLUTION ORAL EVERY 4 HOURS PRN
Status: CANCELLED | OUTPATIENT
Start: 2018-01-01

## 2018-01-01 RX ADMIN — LORAZEPAM 1 MG: 2 INJECTION, SOLUTION INTRAMUSCULAR; INTRAVENOUS at 20:46

## 2018-01-01 RX ADMIN — PIPERACILLIN SODIUM,TAZOBACTAM SODIUM 3.38 G: 3; .375 INJECTION, POWDER, FOR SOLUTION INTRAVENOUS at 01:46

## 2018-01-01 RX ADMIN — LEVOTHYROXINE SODIUM 88 MCG: 0.09 TABLET ORAL at 10:00

## 2018-01-01 RX ADMIN — IPRATROPIUM BROMIDE AND ALBUTEROL SULFATE 3 ML: .5; 3 SOLUTION RESPIRATORY (INHALATION) at 23:43

## 2018-01-01 RX ADMIN — ONDANSETRON 4 MG: 2 INJECTION, SOLUTION INTRAMUSCULAR; INTRAVENOUS at 05:44

## 2018-01-01 RX ADMIN — DILTIAZEM HYDROCHLORIDE 30 MG: 30 TABLET, FILM COATED ORAL at 10:02

## 2018-01-01 RX ADMIN — MORPHINE SULFATE 2 MG: 2 INJECTION, SOLUTION INTRAMUSCULAR; INTRAVENOUS at 14:54

## 2018-01-01 RX ADMIN — IPRATROPIUM BROMIDE AND ALBUTEROL SULFATE 3 ML: .5; 3 SOLUTION RESPIRATORY (INHALATION) at 09:05

## 2018-01-01 RX ADMIN — ASPIRIN 81 MG 81 MG: 81 TABLET ORAL at 10:00

## 2018-01-01 RX ADMIN — ACETAMINOPHEN 1000 MG: 500 TABLET, FILM COATED ORAL at 05:45

## 2018-01-01 RX ADMIN — MECLIZINE 12.5 MG: 12.5 TABLET ORAL at 16:08

## 2018-01-01 RX ADMIN — GUAIFENESIN 1200 MG: 600 TABLET, EXTENDED RELEASE ORAL at 20:29

## 2018-01-01 RX ADMIN — GUAIFENESIN 1200 MG: 600 TABLET, EXTENDED RELEASE ORAL at 10:00

## 2018-01-01 RX ADMIN — AZITHROMYCIN MONOHYDRATE 500 MG: 500 INJECTION, POWDER, LYOPHILIZED, FOR SOLUTION INTRAVENOUS at 10:36

## 2018-01-01 RX ADMIN — IPRATROPIUM BROMIDE AND ALBUTEROL SULFATE 3 ML: .5; 3 SOLUTION RESPIRATORY (INHALATION) at 16:11

## 2018-01-01 RX ADMIN — BUSPIRONE HYDROCHLORIDE 15 MG: 15 TABLET ORAL at 05:45

## 2018-01-01 RX ADMIN — VALPROATE SODIUM 125 MG: 100 INJECTION, SOLUTION INTRAVENOUS at 21:02

## 2018-01-01 RX ADMIN — ASPIRIN 81 MG 81 MG: 81 TABLET ORAL at 08:52

## 2018-01-01 RX ADMIN — MORPHINE SULFATE 2 MG: 4 INJECTION, SOLUTION INTRAMUSCULAR; INTRAVENOUS at 11:46

## 2018-01-01 RX ADMIN — PIPERACILLIN SODIUM,TAZOBACTAM SODIUM 3.38 G: 3; .375 INJECTION, POWDER, FOR SOLUTION INTRAVENOUS at 17:51

## 2018-01-01 RX ADMIN — CLONAZEPAM 0.5 MG: 0.5 TABLET ORAL at 08:32

## 2018-01-01 RX ADMIN — Medication 1 CAPSULE: at 12:14

## 2018-01-01 RX ADMIN — GUAIFENESIN 1200 MG: 600 TABLET, EXTENDED RELEASE ORAL at 20:52

## 2018-01-01 RX ADMIN — ACETAMINOPHEN 1000 MG: 500 TABLET, FILM COATED ORAL at 12:14

## 2018-01-01 RX ADMIN — MORPHINE SULFATE 2 MG: 2 INJECTION, SOLUTION INTRAMUSCULAR; INTRAVENOUS at 11:21

## 2018-01-01 RX ADMIN — VANCOMYCIN HYDROCHLORIDE 500 MG: 500 INJECTION, POWDER, LYOPHILIZED, FOR SOLUTION INTRAVENOUS at 19:37

## 2018-01-01 RX ADMIN — IPRATROPIUM BROMIDE AND ALBUTEROL SULFATE 3 ML: .5; 3 SOLUTION RESPIRATORY (INHALATION) at 11:27

## 2018-01-01 RX ADMIN — ISOSORBIDE DINITRATE 10 MG: 10 TABLET ORAL at 17:42

## 2018-01-01 RX ADMIN — ISOSORBIDE DINITRATE 10 MG: 10 TABLET ORAL at 10:03

## 2018-01-01 RX ADMIN — DONEPEZIL HYDROCHLORIDE 10 MG: 5 TABLET ORAL at 20:52

## 2018-01-01 RX ADMIN — DOCUSATE SODIUM 100 MG: 100 CAPSULE, LIQUID FILLED ORAL at 20:52

## 2018-01-01 RX ADMIN — SCOPALAMINE 1 PATCH: 1 PATCH, EXTENDED RELEASE TRANSDERMAL at 16:58

## 2018-01-01 RX ADMIN — IPRATROPIUM BROMIDE AND ALBUTEROL SULFATE 3 ML: .5; 3 SOLUTION RESPIRATORY (INHALATION) at 07:20

## 2018-01-01 RX ADMIN — PIPERACILLIN SODIUM,TAZOBACTAM SODIUM 3.38 G: 3; .375 INJECTION, POWDER, FOR SOLUTION INTRAVENOUS at 17:39

## 2018-01-01 RX ADMIN — PANTOPRAZOLE SODIUM 40 MG: 40 TABLET, DELAYED RELEASE ORAL at 10:00

## 2018-01-01 RX ADMIN — ACETAMINOPHEN 1000 MG: 500 TABLET, FILM COATED ORAL at 20:29

## 2018-01-01 RX ADMIN — ENOXAPARIN SODIUM 60 MG: 60 INJECTION SUBCUTANEOUS at 09:39

## 2018-01-01 RX ADMIN — CLONAZEPAM 0.5 MG: 0.5 TABLET ORAL at 10:00

## 2018-01-01 RX ADMIN — BUDESONIDE 0.5 MG: 0.5 SUSPENSION RESPIRATORY (INHALATION) at 18:48

## 2018-01-01 RX ADMIN — ACETAMINOPHEN 1000 MG: 500 TABLET, FILM COATED ORAL at 03:59

## 2018-01-01 RX ADMIN — CLONAZEPAM 0.5 MG: 0.5 TABLET ORAL at 14:14

## 2018-01-01 RX ADMIN — ACETAMINOPHEN 1000 MG: 500 TABLET, FILM COATED ORAL at 20:52

## 2018-01-01 RX ADMIN — IPRATROPIUM BROMIDE AND ALBUTEROL SULFATE 3 ML: .5; 3 SOLUTION RESPIRATORY (INHALATION) at 19:30

## 2018-01-01 RX ADMIN — GUAIFENESIN 1200 MG: 600 TABLET, EXTENDED RELEASE ORAL at 20:03

## 2018-01-01 RX ADMIN — IOPAMIDOL 100 ML: 755 INJECTION, SOLUTION INTRAVENOUS at 13:16

## 2018-01-01 RX ADMIN — GLYCOPYRROLATE 0.1 MG: 0.2 INJECTION INTRAMUSCULAR; INTRAVENOUS at 16:48

## 2018-01-01 RX ADMIN — ALBUTEROL SULFATE 2.5 MG: 2.5 SOLUTION RESPIRATORY (INHALATION) at 16:34

## 2018-01-01 RX ADMIN — CLONAZEPAM 0.5 MG: 0.5 TABLET ORAL at 20:29

## 2018-01-01 RX ADMIN — CLONAZEPAM 0.5 MG: 0.5 TABLET ORAL at 08:52

## 2018-01-01 RX ADMIN — BUSPIRONE HYDROCHLORIDE 10 MG: 5 TABLET ORAL at 20:29

## 2018-01-01 RX ADMIN — MORPHINE SULFATE 2 MG: 2 INJECTION, SOLUTION INTRAMUSCULAR; INTRAVENOUS at 18:37

## 2018-01-01 RX ADMIN — ASPIRIN 300 MG: 300 SUPPOSITORY RECTAL at 10:35

## 2018-01-01 RX ADMIN — DIVALPROEX SODIUM 125 MG: 125 CAPSULE, COATED PELLETS ORAL at 20:52

## 2018-01-01 RX ADMIN — CLONAZEPAM 0.5 MG: 0.5 TABLET ORAL at 17:28

## 2018-01-01 RX ADMIN — IPRATROPIUM BROMIDE AND ALBUTEROL SULFATE 3 ML: .5; 3 SOLUTION RESPIRATORY (INHALATION) at 07:18

## 2018-01-01 RX ADMIN — BUDESONIDE 0.5 MG: 0.5 SUSPENSION RESPIRATORY (INHALATION) at 07:18

## 2018-01-01 RX ADMIN — ONDANSETRON 4 MG: 2 INJECTION, SOLUTION INTRAMUSCULAR; INTRAVENOUS at 08:52

## 2018-01-01 RX ADMIN — MORPHINE SULFATE 2 MG: 2 INJECTION, SOLUTION INTRAMUSCULAR; INTRAVENOUS at 12:51

## 2018-01-01 RX ADMIN — MORPHINE SULFATE 2 MG: 2 INJECTION, SOLUTION INTRAMUSCULAR; INTRAVENOUS at 06:36

## 2018-01-01 RX ADMIN — LORAZEPAM 1 MG: 2 INJECTION, SOLUTION INTRAMUSCULAR; INTRAVENOUS at 11:21

## 2018-01-01 RX ADMIN — DOCUSATE SODIUM 100 MG: 100 CAPSULE, LIQUID FILLED ORAL at 10:00

## 2018-01-01 RX ADMIN — ACETAMINOPHEN 1000 MG: 500 TABLET, FILM COATED ORAL at 13:15

## 2018-01-01 RX ADMIN — Medication 1 CAPSULE: at 08:52

## 2018-01-01 RX ADMIN — MORPHINE SULFATE 2 MG: 2 INJECTION, SOLUTION INTRAMUSCULAR; INTRAVENOUS at 00:59

## 2018-01-01 RX ADMIN — ACETAMINOPHEN 1000 MG: 500 TABLET, FILM COATED ORAL at 05:06

## 2018-01-01 RX ADMIN — METHYLPREDNISOLONE SODIUM SUCCINATE 125 MG: 125 INJECTION, POWDER, FOR SOLUTION INTRAMUSCULAR; INTRAVENOUS at 11:06

## 2018-01-01 RX ADMIN — PIPERACILLIN SODIUM,TAZOBACTAM SODIUM 3.38 G: 3; .375 INJECTION, POWDER, FOR SOLUTION INTRAVENOUS at 01:20

## 2018-01-01 RX ADMIN — DOCUSATE SODIUM 100 MG: 100 CAPSULE, LIQUID FILLED ORAL at 20:03

## 2018-01-01 RX ADMIN — LEVOTHYROXINE SODIUM 88 MCG: 0.09 TABLET ORAL at 08:33

## 2018-01-01 RX ADMIN — GUAIFENESIN 1200 MG: 600 TABLET, EXTENDED RELEASE ORAL at 08:52

## 2018-01-01 RX ADMIN — MORPHINE SULFATE 4 MG: 2 INJECTION, SOLUTION INTRAMUSCULAR; INTRAVENOUS at 15:06

## 2018-01-01 RX ADMIN — ALBUTEROL SULFATE 2.5 MG: 2.5 SOLUTION RESPIRATORY (INHALATION) at 09:21

## 2018-01-01 RX ADMIN — DONEPEZIL HYDROCHLORIDE 10 MG: 5 TABLET ORAL at 20:29

## 2018-01-01 RX ADMIN — PIPERACILLIN SODIUM,TAZOBACTAM SODIUM 3.38 G: 3; .375 INJECTION, POWDER, FOR SOLUTION INTRAVENOUS at 01:36

## 2018-01-01 RX ADMIN — SODIUM CHLORIDE 250 ML: 9 INJECTION, SOLUTION INTRAVENOUS at 19:37

## 2018-01-01 RX ADMIN — IPRATROPIUM BROMIDE AND ALBUTEROL SULFATE 3 ML: .5; 3 SOLUTION RESPIRATORY (INHALATION) at 18:48

## 2018-01-01 RX ADMIN — BUSPIRONE HYDROCHLORIDE 15 MG: 15 TABLET ORAL at 23:08

## 2018-01-01 RX ADMIN — DIVALPROEX SODIUM 125 MG: 125 CAPSULE, COATED PELLETS ORAL at 08:52

## 2018-01-01 RX ADMIN — SODIUM CHLORIDE 100 ML/HR: 9 INJECTION, SOLUTION INTRAVENOUS at 06:33

## 2018-01-01 RX ADMIN — POLYETHYLENE GLYCOL (3350) 17 G: 17 POWDER, FOR SOLUTION ORAL at 08:34

## 2018-01-01 RX ADMIN — PIPERACILLIN SODIUM,TAZOBACTAM SODIUM 3.38 G: 3; .375 INJECTION, POWDER, FOR SOLUTION INTRAVENOUS at 17:42

## 2018-01-01 RX ADMIN — PIPERACILLIN SODIUM,TAZOBACTAM SODIUM 3.38 G: 3; .375 INJECTION, POWDER, FOR SOLUTION INTRAVENOUS at 09:01

## 2018-01-01 RX ADMIN — ALBUTEROL SULFATE 2.5 MG: 2.5 SOLUTION RESPIRATORY (INHALATION) at 19:02

## 2018-01-01 RX ADMIN — SODIUM CHLORIDE 100 ML/HR: 9 INJECTION, SOLUTION INTRAVENOUS at 17:51

## 2018-01-01 RX ADMIN — VALPROATE SODIUM 125 MG: 100 INJECTION, SOLUTION INTRAVENOUS at 09:38

## 2018-01-01 RX ADMIN — DOCUSATE SODIUM 100 MG: 100 CAPSULE, LIQUID FILLED ORAL at 13:15

## 2018-01-01 RX ADMIN — BUSPIRONE HYDROCHLORIDE 15 MG: 15 TABLET ORAL at 05:06

## 2018-01-01 RX ADMIN — FUROSEMIDE 40 MG: 10 INJECTION, SOLUTION INTRAMUSCULAR; INTRAVENOUS at 20:30

## 2018-01-01 RX ADMIN — MECLIZINE 12.5 MG: 12.5 TABLET ORAL at 14:13

## 2018-01-01 RX ADMIN — SODIUM CHLORIDE 250 ML: 9 INJECTION, SOLUTION INTRAVENOUS at 09:48

## 2018-01-01 RX ADMIN — MORPHINE SULFATE 2 MG: 2 INJECTION, SOLUTION INTRAMUSCULAR; INTRAVENOUS at 03:57

## 2018-01-01 RX ADMIN — PANTOPRAZOLE SODIUM 40 MG: 40 TABLET, DELAYED RELEASE ORAL at 08:52

## 2018-01-01 RX ADMIN — ASPIRIN 81 MG 81 MG: 81 TABLET ORAL at 08:33

## 2018-01-01 RX ADMIN — PANTOPRAZOLE SODIUM 40 MG: 40 TABLET, DELAYED RELEASE ORAL at 20:29

## 2018-01-01 RX ADMIN — ACETAMINOPHEN 1000 MG: 500 TABLET, FILM COATED ORAL at 20:03

## 2018-01-01 RX ADMIN — IPRATROPIUM BROMIDE AND ALBUTEROL SULFATE 3 ML: .5; 3 SOLUTION RESPIRATORY (INHALATION) at 07:35

## 2018-01-01 RX ADMIN — GUAIFENESIN 1200 MG: 600 TABLET, EXTENDED RELEASE ORAL at 08:34

## 2018-01-01 RX ADMIN — BUSPIRONE HYDROCHLORIDE 15 MG: 15 TABLET ORAL at 14:13

## 2018-01-01 RX ADMIN — MORPHINE SULFATE 2 MG: 2 INJECTION, SOLUTION INTRAMUSCULAR; INTRAVENOUS at 22:47

## 2018-01-01 RX ADMIN — DIVALPROEX SODIUM 125 MG: 125 CAPSULE, COATED PELLETS ORAL at 10:00

## 2018-01-01 RX ADMIN — ENOXAPARIN SODIUM 30 MG: 60 INJECTION SUBCUTANEOUS at 23:07

## 2018-01-01 RX ADMIN — MECLIZINE 12.5 MG: 12.5 TABLET ORAL at 05:45

## 2018-01-01 RX ADMIN — DONEPEZIL HYDROCHLORIDE 10 MG: 5 TABLET ORAL at 20:03

## 2018-01-01 RX ADMIN — ACETAMINOPHEN 650 MG: 650 SUPPOSITORY RECTAL at 09:58

## 2018-01-01 RX ADMIN — TAZOBACTAM SODIUM AND PIPERACILLIN SODIUM 4.5 G: .5; 4 INJECTION, POWDER, LYOPHILIZED, FOR SOLUTION INTRAVENOUS at 09:49

## 2018-01-01 RX ADMIN — PANTOPRAZOLE SODIUM 40 MG: 40 TABLET, DELAYED RELEASE ORAL at 08:33

## 2018-01-01 RX ADMIN — MORPHINE SULFATE 2 MG: 2 INJECTION, SOLUTION INTRAMUSCULAR; INTRAVENOUS at 09:51

## 2018-01-01 RX ADMIN — IPRATROPIUM BROMIDE AND ALBUTEROL SULFATE 3 ML: .5; 3 SOLUTION RESPIRATORY (INHALATION) at 15:41

## 2018-01-01 RX ADMIN — DIVALPROEX SODIUM 125 MG: 125 CAPSULE, COATED PELLETS ORAL at 20:03

## 2018-01-01 RX ADMIN — NITROGLYCERIN 1 INCH: 20 OINTMENT TOPICAL at 10:05

## 2018-01-01 RX ADMIN — BUDESONIDE 0.5 MG: 0.5 SUSPENSION RESPIRATORY (INHALATION) at 11:29

## 2018-01-01 RX ADMIN — VANCOMYCIN HYDROCHLORIDE 1000 MG: 1 INJECTION, POWDER, LYOPHILIZED, FOR SOLUTION INTRAVENOUS at 19:36

## 2018-01-01 RX ADMIN — MORPHINE SULFATE 2 MG: 2 INJECTION, SOLUTION INTRAMUSCULAR; INTRAVENOUS at 17:12

## 2018-01-01 RX ADMIN — PIPERACILLIN SODIUM,TAZOBACTAM SODIUM 3.38 G: 3; .375 INJECTION, POWDER, FOR SOLUTION INTRAVENOUS at 08:53

## 2018-01-01 RX ADMIN — IPRATROPIUM BROMIDE AND ALBUTEROL SULFATE 3 ML: .5; 3 SOLUTION RESPIRATORY (INHALATION) at 12:02

## 2018-01-01 RX ADMIN — ISOSORBIDE DINITRATE 10 MG: 10 TABLET ORAL at 08:52

## 2018-01-01 RX ADMIN — MECLIZINE 12.5 MG: 12.5 TABLET ORAL at 23:08

## 2018-01-01 RX ADMIN — GLYCOPYRROLATE 0.1 MG: 0.2 INJECTION INTRAMUSCULAR; INTRAVENOUS at 22:50

## 2018-01-01 RX ADMIN — BUSPIRONE HYDROCHLORIDE 15 MG: 15 TABLET ORAL at 21:02

## 2018-01-01 RX ADMIN — GLYCOPYRROLATE 0.1 MG: 0.2 INJECTION INTRAMUSCULAR; INTRAVENOUS at 17:11

## 2018-01-01 RX ADMIN — DILTIAZEM HYDROCHLORIDE 30 MG: 30 TABLET, FILM COATED ORAL at 20:52

## 2018-01-01 RX ADMIN — ALBUTEROL SULFATE 2.5 MG: 2.5 SOLUTION RESPIRATORY (INHALATION) at 09:12

## 2018-01-01 RX ADMIN — PIPERACILLIN SODIUM,TAZOBACTAM SODIUM 3.38 G: 3; .375 INJECTION, POWDER, FOR SOLUTION INTRAVENOUS at 10:03

## 2018-01-01 RX ADMIN — DOCUSATE SODIUM 100 MG: 100 CAPSULE, LIQUID FILLED ORAL at 08:52

## 2018-01-01 RX ADMIN — MORPHINE SULFATE 2 MG: 2 INJECTION, SOLUTION INTRAMUSCULAR; INTRAVENOUS at 19:38

## 2018-01-01 RX ADMIN — BUSPIRONE HYDROCHLORIDE 10 MG: 5 TABLET ORAL at 08:33

## 2018-01-01 RX ADMIN — ENOXAPARIN SODIUM 60 MG: 80 INJECTION SUBCUTANEOUS at 10:35

## 2018-01-01 RX ADMIN — LEVOTHYROXINE SODIUM 88 MCG: 0.09 TABLET ORAL at 08:52

## 2018-01-01 RX ADMIN — ONDANSETRON 4 MG: 2 INJECTION, SOLUTION INTRAMUSCULAR; INTRAVENOUS at 10:52

## 2018-01-01 RX ADMIN — IPRATROPIUM BROMIDE AND ALBUTEROL SULFATE 3 ML: .5; 3 SOLUTION RESPIRATORY (INHALATION) at 11:09

## 2018-06-13 NOTE — ED PROVIDER NOTES
"Subjective   Ms Cornelia Marks is a 89 yo WF who states she is unsure why she is in the emergency room.  She is a resident at the [facility]. This morning she was getting out of bed when she slipped and fell into a door.  Patient states that she \"landed on my bottom\".  However patient denies any injury.  She is not in any pain.  Patient does not understand why she was sent to the emergency room.        History provided by:  Patient  Fall   Mechanism of injury: fall    Injury location: none.  Incident location:  Nursing home  Arrived directly from scene: yes    Fall:     Fall occurred: as patient was getting out of bed.    Impact surface:  Hard floor    Point of impact:  Buttocks  Suspicion of alcohol use: no    Suspicion of drug use: no    Prior to arrival data:     Blood loss:  None    Responsiveness at scene:  Alert    Orientation at scene:  Person and place    Loss of consciousness: no      Amnesic to event: no    Associated symptoms: no abdominal pain, no back pain, no chest pain, no difficulty breathing, no headaches, no loss of consciousness, no nausea, no neck pain, no seizures and no vomiting    Risk factors: no AICD, no anticoagulation therapy, no asthma, no beta blocker therapy, no CABG, no CAD, no CHF, no COPD, no diabetes, no dialysis and no hemophilia        Review of Systems   Constitutional: Negative.  Negative for appetite change, diaphoresis and fever.   HENT: Negative.    Eyes: Negative.    Respiratory: Negative for cough, chest tightness, shortness of breath and wheezing.    Cardiovascular: Negative for chest pain, palpitations and leg swelling.   Gastrointestinal: Negative for abdominal pain, nausea and vomiting.   Genitourinary: Negative.  Negative for difficulty urinating, flank pain, frequency and hematuria.   Musculoskeletal: Negative.  Negative for back pain and neck pain.   Skin: Negative.  Negative for color change, pallor and rash.   Neurological: Negative.  Negative for dizziness, " seizures, loss of consciousness, syncope and headaches.   Psychiatric/Behavioral: Negative.  Negative for agitation, behavioral problems and hallucinations.       Past Medical History:   Diagnosis Date   • Arthritis    • Constipation    • Degenerative disc disease, lumbar    • Dizziness    • Edema extremities    • Hard of hearing    • Hyperlipidemia    • Hypertension    • Inner ear dysfunction    • Macular degeneration        Allergies   Allergen Reactions   • Epinephrine Palpitations       Past Surgical History:   Procedure Laterality Date   • ADENOIDECTOMY     • APPENDECTOMY     • CHOLECYSTECTOMY     • EYE SURGERY     • TONSILLECTOMY     • TOTAL HIP ARTHROPLASTY Right    • TOTAL KNEE ARTHROPLASTY Left        History reviewed. No pertinent family history.    Social History     Social History   • Marital status:      Social History Main Topics   • Smoking status: Never Smoker   • Alcohol use No   • Drug use: No   • Sexual activity: Not Currently     Other Topics Concern   • Not on file           Objective   Physical Exam   Constitutional: She appears well-developed and well-nourished. No distress.   HENT:   Head: Normocephalic and atraumatic.   Right Ear: External ear normal.   Left Ear: External ear normal.   Nose: Nose normal.   Mouth/Throat: Oropharynx is clear and moist.   Eyes: Conjunctivae and EOM are normal. Pupils are equal, round, and reactive to light.   Neck: Normal range of motion. Neck supple.   Cardiovascular: Normal rate, regular rhythm, normal heart sounds and intact distal pulses.  Exam reveals no gallop and no friction rub.    No murmur heard.  Pulmonary/Chest: Effort normal and breath sounds normal.   Abdominal: Soft. Bowel sounds are normal. She exhibits no distension. There is no tenderness.   Musculoskeletal: Normal range of motion.   Neurological: She is alert. She has normal reflexes. She is disoriented (to time).   Skin: Skin is warm and dry. She is not diaphoretic.   Psychiatric: She  "has a normal mood and affect. Her behavior is normal.   Nursing note and vitals reviewed.      Procedures           ED Course  ED Course as of Jun 13 0648   Wed Jun 13, 2018   0621 Patient has no complaints.  She  states she slipped as she was getting out of bed and \"landed on my butt\".  Patient has no visible of buttock contusion.  She  has no tenderness to palpation on exam. Full range of motion in lower extremities with no pain.  No pain on sitting.  No pain on the torso rotation. No indication for X-ray.  I will discharge the patient back to the nursing home.  [SS]      ED Course User Index  [SS] Parish Bhatia MD                  MDM  Number of Diagnoses or Management Options  Fall, initial encounter: minor  Risk of Complications, Morbidity, and/or Mortality  Presenting problems: minimal  Diagnostic procedures: minimal  Management options: minimal    Patient Progress  Patient progress: stable        Final diagnoses:   Fall, initial encounter            Parish Bhatia MD  06/13/18 0648    "

## 2018-06-13 NOTE — ED NOTES
Spoke to the nurse at the [facility].  Pt's daughter, Marce Hayden, could not be reached by phone.  [facility] will send van to pick pt up after 0700.     Heidi Ag RN  06/13/18 0676

## 2018-06-17 PROBLEM — J18.9 PNEUMONIA OF BOTH LUNGS DUE TO INFECTIOUS ORGANISM: Status: ACTIVE | Noted: 2018-01-01

## 2018-06-22 LAB
BACTERIA SPEC AEROBE CULT: NORMAL
BACTERIA SPEC AEROBE CULT: NORMAL